# Patient Record
Sex: FEMALE | Race: WHITE | Employment: OTHER | URBAN - METROPOLITAN AREA
[De-identification: names, ages, dates, MRNs, and addresses within clinical notes are randomized per-mention and may not be internally consistent; named-entity substitution may affect disease eponyms.]

---

## 2017-01-09 PROBLEM — H61.23 IMPACTED CERUMEN, BILATERAL: Status: ACTIVE | Noted: 2017-01-09

## 2017-08-15 PROBLEM — J33.9 NASAL POLYPS: Status: ACTIVE | Noted: 2017-08-15

## 2017-08-15 PROBLEM — H90.3 SENSORINEURAL HEARING LOSS (SNHL) OF BOTH EARS: Status: ACTIVE | Noted: 2017-08-15

## 2018-07-24 PROCEDURE — 88305 TISSUE EXAM BY PATHOLOGIST: CPT | Performed by: OBSTETRICS & GYNECOLOGY

## 2019-04-25 PROBLEM — Z85.828 HISTORY OF BASAL CELL CANCER: Status: ACTIVE | Noted: 2019-04-25

## 2022-04-07 PROBLEM — M81.8 OTHER OSTEOPOROSIS WITHOUT CURRENT PATHOLOGICAL FRACTURE: Status: ACTIVE | Noted: 2022-04-07

## 2024-08-14 ENCOUNTER — TELEPHONE (OUTPATIENT)
Dept: UROLOGY | Facility: CLINIC | Age: 79
End: 2024-08-14

## 2024-08-14 NOTE — TELEPHONE ENCOUNTER
Outgoing call to patient to gather history prior to 8/19/24 10 am apt.   [facility] with call back number

## 2024-08-19 ENCOUNTER — OFFICE VISIT (OUTPATIENT)
Dept: UROLOGY | Facility: CLINIC | Age: 79
End: 2024-08-19
Attending: OBSTETRICS & GYNECOLOGY
Payer: MEDICARE

## 2024-08-19 VITALS
SYSTOLIC BLOOD PRESSURE: 164 MMHG | HEIGHT: 61.18 IN | DIASTOLIC BLOOD PRESSURE: 78 MMHG | TEMPERATURE: 98 F | BODY MASS INDEX: 22.84 KG/M2 | WEIGHT: 121 LBS

## 2024-08-19 DIAGNOSIS — N81.84 PELVIC MUSCLE WASTING: ICD-10-CM

## 2024-08-19 DIAGNOSIS — N81.11 CYSTOCELE, MIDLINE: ICD-10-CM

## 2024-08-19 DIAGNOSIS — N81.2 UTEROVAGINAL PROLAPSE, INCOMPLETE: ICD-10-CM

## 2024-08-19 DIAGNOSIS — N39.41 URGE INCONTINENCE: Primary | ICD-10-CM

## 2024-08-19 DIAGNOSIS — N81.6 RECTOCELE: ICD-10-CM

## 2024-08-19 PROCEDURE — 99212 OFFICE O/P EST SF 10 MIN: CPT

## 2024-08-19 RX ORDER — MAGNESIUM 200 MG
TABLET ORAL
COMMUNITY

## 2024-08-19 NOTE — PROGRESS NOTES
Called office of Dr. Roberto Bowie, PCP.  Left message with  requesting Dr. Bowie's recommendation for general surgeon for possible inguinal hernia evaluation.

## 2024-08-20 ENCOUNTER — ORDER TRANSCRIPTION (OUTPATIENT)
Dept: PHYSICAL THERAPY | Facility: HOSPITAL | Age: 79
End: 2024-08-20

## 2024-08-20 DIAGNOSIS — N39.41 URGE INCONTINENCE: Primary | ICD-10-CM

## 2024-08-20 DIAGNOSIS — N81.84 PELVIC MUSCLE WASTING: ICD-10-CM

## 2024-08-20 DIAGNOSIS — N81.2 UTEROVAGINAL PROLAPSE, INCOMPLETE: ICD-10-CM

## 2024-08-28 ENCOUNTER — TELEPHONE (OUTPATIENT)
Dept: UROLOGY | Facility: CLINIC | Age: 79
End: 2024-08-28

## 2024-08-28 NOTE — TELEPHONE ENCOUNTER
2nd call attempt to Dr. Bowie's office requesting general surgeon recommendation from Dr. Bowie, patient's PCP,  per Dr. Scott request on 8/19/24 to evaluate possible inguinal hernia.  Left message with front office, they report clinical will call back.

## 2024-10-07 ENCOUNTER — TELEPHONE (OUTPATIENT)
Dept: PHYSICAL THERAPY | Facility: HOSPITAL | Age: 79
End: 2024-10-07

## 2024-10-08 ENCOUNTER — APPOINTMENT (OUTPATIENT)
Dept: PHYSICAL THERAPY | Facility: HOSPITAL | Age: 79
End: 2024-10-08
Attending: OBSTETRICS & GYNECOLOGY
Payer: MEDICARE

## 2024-10-08 DIAGNOSIS — N81.84 PELVIC MUSCLE WASTING: ICD-10-CM

## 2024-10-08 DIAGNOSIS — N81.2 UTEROVAGINAL PROLAPSE, INCOMPLETE: ICD-10-CM

## 2024-10-08 DIAGNOSIS — N39.41 URGE INCONTINENCE: Primary | ICD-10-CM

## 2024-10-08 PROCEDURE — 97112 NEUROMUSCULAR REEDUCATION: CPT

## 2024-10-08 PROCEDURE — 97110 THERAPEUTIC EXERCISES: CPT

## 2024-10-08 PROCEDURE — 97162 PT EVAL MOD COMPLEX 30 MIN: CPT

## 2024-10-08 NOTE — PROGRESS NOTES
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 Has to push prolapse back up after standing for awhile. MD noted R inguinal hernia that pt hadn't noticed previously.  Current symptoms include: pressure and leakage    Pt describes pain level: none    Obstetrical/Gynecological history: : 6  Para: 5  Delivery method: vaginal  Occupation/Activities: yoga, exercise, bookclub, goes to lunch/dinner with friends.  PFDI-20: 94.80/300    Judy describes prior level of function no prolapse/pelvic pressure. Pt goals include decrease prolapse.  Past medical history was reviewed with Judy. Significant findings include  has a past medical history of Glaucoma, High blood pressure, HIGH CHOLESTEROL, History of breast cancer, History of skin cancer, MENOPAUSE, and OSTEOPENIA.     URINARY HABITS  Types of symptoms: urge incontinence and nocturia  Events associated with the onset of urinary complaints: n/a  Abdominal/Vaginal Pressure complaints: yes  Urinary Frequency: 5x/day, every 3 hours  Urine Stream: strong  Amount: normal  Leaking occurs: sometimes at night when doesn't make it to the bathroom  Episodes of Leakage: occasionally when has urgency at night and doesn't make it to the bathroom  Amount of leakage: min  Pad use: yes  Pad Change frequency: as needed  Nocturia: 2  Fluid Intake: water  Bladder irritants: 3  Urine Stop test: not sure  Post void dribble: no  Hovering: no  Empty bladder just in case: some times  Do you ever leak urine without knowing it? no    BOWEL HABITS  Types of symptoms: none   Frequency  of bowel movements: 2x/day  Do you strain with defecation: No   Laxative use: No    SEXUAL HEALTH STATUS  Sexual [place] Status: not active    ASSESSMENT  Judy presents to physical therapy evaluation with primary c/o pelvic organ prolapse. The results of the objective tests and measures show  decreased pelvic floor muscle strength and coordination, fair bladder habits, decreased core and hip strength, fair body mechanics with increased intra-abdominal pressure with transferring .  Functional deficits include but are not limited to embarrassment with leakage. 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 Pt and PT discussed evaluation findings, pathology, POC and HEP.  Pt voiced understanding and performs HEP correctly without reported pain. Skilled Pelvic Physical Therapy is medically necessary to address the above impairments and reach functional goals.    OBJECTIVE:   Posture: neutral  Pelvic Alignment: symmetrical   Gait: pt ambulates on level ground with normal mechanics.     External Palpation: no tenderness  Abdominal Wall Integrity: no restrictions   Diastasis Recti: none    Range Of Motion  Lumbar AROM screen: wfl  LE AROM screen: grossly WNL     Strength (MMT) 5/5 VENKAT LE except B hip abd and ext 4//5  Transverse Abdominis: 1/5    Flexibility Summary: WNL VENKAT LE except hamstrings -10 B 90/90    Informed consent for internal pelvic evaluation given: Yes    External Observation:   Voluntary contraction: present   Voluntary relaxation: present  Involuntary contraction: absent  Involuntary relaxation: present    Mons pubis: WNL  Labia majora: WNL  Labia minora: WNL  Urethral meatus: WNL  Introitus: WNL  Perineal body: WNL    Sensory/Reflex:  Vestibule: normal bilaterally  Anal [place]: Not Tested    Internal Examination     Pelvic Floor Muscle strength: (PERF= Power/Endurance/Reps/Fast) MMT: 1/1/3/unable  to do - pelvic floor muscles improved with internal pelvic floor muscles retraining  External Anal Sphincter: nt  Accessory Muscle Use: gluteals, adductors, abdominals    Tissue Laxity Test:  Anterior Wall: Mod  Posterior Wall: Min  Apical: Min    Eccentric lengthening contraction: wnl  Bearing down Valsalva maneuver (2-3x): ++ cystocele    Internal Palpation: WNL     Today's Treatment and Response:   Patient education was provided on objective findings of external and internal evaluation and expectations with treatment outcomes. Educated on pelvic anatomy and function with diagrams and pelvic model, bladder normatives, adequate hydration levels, proper toileting posture, stress/urge urinary incontinence strategies, coordination of diaphragmatic breathing and pelvic floor contraction , and knack/pelvic muscle brace, prolapse do's and dont's     Manual therapy/NM Re-ed- internal retraining pelvic floor muscles to learn to perform diaphragmatic breathing , abdominal bracing , kegel     Patient was instructed in and issued a HEP for: abdominal bracing x10, diaphragmatic breathing x10, Kegel 10 repetitions 3x/day, 10 sec on/10 sec off, 2 sec on 2-4 sec off     Charges: PT Eval Moderate Complexity, TE 15 NM 15      Total Timed Treatment: 30 min     Total Treatment Time: 50 min     Based on clinical rationale and outcome measures, this evaluation involved Moderate Complexity decision making due to 1-2 personal factors/comorbidities, 3 body structures involved/activity limitations, and evolving symptoms including urge urinary incontinence and pelvic organ prolapse  PLAN OF CARE:    Goals: (to be met in 10 visits)  1. Independent in HEP and progression with improved understanding of bladder/bowel health, bladder retraining and long-term management.    2. Patient will demonstrate improved bladder voiding habits to voiding every 2 hours with less urinary leakage.  3. Patient will demonstrate improve PFM isolation,  coordination and strength so she is able to reduce urinary urge and decrease leakage during ADL's.  4. Nocturnal voiding 1x/night for improved sleep.  5. PFM contraction before increase intra-abdominal pressure.  6. Pt will demonstrate correct pressure management strategies when transferring supine>sit      Frequency / Duration: Patient will be seen for 1 x/week or a total of 10 visits over a 90 day period.  Treatment will include: Manual Therapy, Neuromuscular Re-education, Self-Care Home Management, Therapeutic Activities, Therapeutic Exercise, Home Exercise Program instruction, and Modalities to include: Electrical stimulation (unattended)     Education or treatment limitation: None  Rehab Potential:good      Patient/Family/Caregiver was advised of these findings, precautions, and treatment options and has agreed to actively participate in planning and for this course of care.    Thank you for your referral. Please co-sign or sign and return this letter via fax as soon as possible to [phone]. If you have any questions, please contact me at Dept: [phone]    Sincerely,  Electronically signed by therapist: Annmarie Cho PT  Physician's certification required: Yes  I certify the need for these services furnished under this plan of treatment and while under my care.    X___________________________________________________ Date____________________    Certification From: 10/8/2024  To:1/6/2025

## 2024-10-16 ENCOUNTER — TELEPHONE (OUTPATIENT)
Dept: PHYSICAL THERAPY | Facility: HOSPITAL | Age: 79
End: 2024-10-16

## 2024-10-17 ENCOUNTER — APPOINTMENT (OUTPATIENT)
Dept: PHYSICAL THERAPY | Facility: HOSPITAL | Age: 79
End: 2024-10-17
Attending: OBSTETRICS & GYNECOLOGY
Payer: MEDICARE

## 2024-10-17 PROCEDURE — 97112 NEUROMUSCULAR REEDUCATION: CPT

## 2024-10-17 PROCEDURE — 97110 THERAPEUTIC EXERCISES: CPT

## 2024-10-17 NOTE — PROGRESS NOTES
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    Pain: 0/10      Objective: Tx flow sheet       URINARY HABITS  Types of symptoms: urge incontinence and nocturia  Events associated with the onset of urinary complaints: n/a  Abdominal/Vaginal Pressure complaints: yes  Urinary Frequency: 5x/day, every 3 hours  Urine Stream: strong  Amount: normal  Leaking occurs: sometimes at night when doesn't make it to the bathroom  Episodes of Leakage: occasionally when has urgency at night and doesn't make it to the bathroom  Amount of leakage: min  Pad use: yes  Pad Change frequency: as needed  Nocturia: 2  Fluid Intake: water  Bladder irritants: 3  Urine Stop test: not sure  Post void dribble: no  Hovering: no  Empty bladder just in case: some times  Do you ever leak urine without knowing it? no    BOWEL HABITS  Types of symptoms: none   Frequency of bowel movements: 2x/day  Do you strain with defecation: No   Laxative use: No    SEXUAL HEALTH STATUS  Sexual [place] Status: not active    ASSESSMENT  Judy presents to physical therapy evaluation with primary c/o pelvic organ prolapse. The results of the objective tests and measures show  decreased pelvic floor muscle strength and coordination, fair bladder habits, decreased core and hip strength, fair body mechanics with increased intra-abdominal pressure with transferring .  Functional deficits include but are not limited to embarrassment with leakage. 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 Pt and PT  discussed evaluation findings, pathology, POC and HEP.  Pt voiced understanding and performs HEP correctly without reported pain. Skilled Pelvic Physical Therapy is medically necessary to address the above impairments and reach functional goals.    OBJECTIVE:   Posture: neutral  Pelvic Alignment: symmetrical   Gait: pt ambulates on level ground with normal mechanics.     External Palpation: no tenderness  Abdominal Wall Integrity: no restrictions   Diastasis Recti: none    Range Of Motion  Lumbar AROM screen: wfl  LE AROM screen: grossly WNL     Strength (MMT) 5/5 VENKAT LE except B hip abd and ext 4//5  Transverse Abdominis: 1/5    Flexibility Summary: WNL VENKAT LE except hamstrings -10 B 90/90    Informed consent for internal pelvic evaluation given: Yes    External Observation:   Voluntary contraction: present   Voluntary relaxation: present  Involuntary contraction: absent  Involuntary relaxation: present    Mons pubis: WNL  Labia majora: WNL  Labia minora: WNL  Urethral meatus: WNL  Introitus: WNL  Perineal body: WNL    Sensory/Reflex:  Vestibule: normal bilaterally  Anal [place]: Not Tested    Internal Examination     Pelvic Floor Muscle strength: (PERF= Power/Endurance/Reps/Fast) MMT: 1/1/3/unable to do - pelvic floor muscles improved with internal pelvic floor muscles retraining  External Anal Sphincter: nt  Accessory Muscle Use: gluteals, adductors, abdominals    Tissue Laxity Test:  Anterior Wall: Mod  Posterior Wall: Min  Apical: Min    Eccentric lengthening contraction: wnl  Bearing down Valsalva maneuver (2-3x): ++ cystocele    Internal Palpation: WNL       Assessment: Extensive instructions with visuals explaining pelvic floor muscles and muscles with fascial connection to assist with supporting pelvic floor muscles which pt appeared to understand. Improved coordination of pelvic floor muscles via fascially connected mm. Upgraded HEP.       Goals:   Goals: (to be met in 10 visits)-progressing  1. Independent  in HEP and progression with improved understanding of bladder/bowel health, bladder retraining and long-term management.    2. Patient will demonstrate improved bladder voiding habits to voiding every 2 hours with less urinary leakage.  3. Patient will demonstrate improve PFM isolation, coordination and strength so she is able to reduce urinary urge and decrease leakage during ADL's.  4. Nocturnal voiding 1x/night for improved sleep.  5. PFM contraction before increase intra-abdominal pressure.  6. Pt will demonstrate correct pressure management strategies when transferring supine>sit    Plan: Continue plan of care as pt tolerates with focus on pelvic floor muscles. Next visit: NuStep 5min L5   .   Date: 10/17/2024  TX#: 2/10 Date:                 TX#: 3/ Date:                 TX#: 4/ Date:                 TX#: 5/ Date:   Tx#: 6/   NM  [facility]/phys pelvic floor muscles /diaphragm /Transverse abdominis /fascially connected mm education    Pressure management strategies -w visuals    Pt education:   Core muscles relationship to pelvic floor muscles     Retraining:  diaphragmatic breathing   abdominal bracing   kegel           Ther Ex:   Kegel +  iso hip add  10\" x10     Kegel + articulating bridge x10 w isometric adduction     Kegel +  Resisted   ER RTB  x10     Kegel + clamshell RTB x10 R/L      Kegel +  U/LE lift  x10     Kegel with plie/ER resisted RTB x10      Issued handout with HEP for above exs       HEP: abdominal bracing x10, diaphragmatic breathing x10, Kegel 10 repetitions 3x/day, 10 sec on/10 sec off, 2 sec on 2-4 sec off , + additions above     Charges: NM2 30 TE 15      Total Timed Treatment: 45 min  Total Treatment Time: 45 min

## 2024-10-23 ENCOUNTER — OFFICE VISIT (OUTPATIENT)
Dept: PHYSICAL THERAPY | Facility: HOSPITAL | Age: 79
End: 2024-10-23
Attending: OBSTETRICS & GYNECOLOGY
Payer: MEDICARE

## 2024-10-23 PROCEDURE — 97110 THERAPEUTIC EXERCISES: CPT

## 2024-10-23 PROCEDURE — 97112 NEUROMUSCULAR REEDUCATION: CPT

## 2024-10-23 NOTE — PROGRESS NOTES
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 This isn't typical.    Pain: 0/10      Objective: Tx flow sheet     Initial evaluation: + updates  URINARY HABITS  Types of symptoms: urge incontinence and nocturia  Abdominal/Vaginal Pressure complaints: yes  Urinary Frequency: 5x/day, every 3 hours  Leaking occurs: sometimes at night when doesn't make it to the bathroom  Episodes of Leakage: occasionally when has urgency at night and doesn't make it to the bathroom  Amount of leakage: min  Pad use: yes  Pad Change frequency: as needed  Nocturia: 2  Fluid Intake: water  Bladder irritants: 3  Urine Stop test: not sure  Hovering: no  Empty bladder just in case: some times      BOWEL HABITS  Types of symptoms: none   Frequency of bowel movements: 2x/day  SEXUAL HEALTH STATUS  Sexual [place] Status: not active    ASSESSMENT  Judy presents to physical therapy evaluation with primary c/o pelvic organ prolapse. The results of the objective tests and measures show  decreased pelvic floor muscle strength and coordination, fair bladder habits, decreased core and hip strength, fair body mechanics with increased intra-abdominal pressure with transferring .  Functional deficits include but are not limited to embarrassment with leakage. 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 Pt and PT discussed evaluation findings, pathology, POC and HEP.  Pt voiced  understanding and performs HEP correctly without reported pain. Skilled Pelvic Physical Therapy is medically necessary to address the above impairments and reach functional goals.    OBJECTIVE:   Posture: neutral  Pelvic Alignment: symmetrical   Gait: pt ambulates on level ground with normal mechanics.     External Palpation: no tenderness  Abdominal Wall Integrity: no restrictions   Diastasis Recti: none    Range Of Motion  Lumbar AROM screen: wfl  LE AROM screen: grossly WNL     Strength (MMT) 5/5 VENKAT LE except B hip abd and ext 4//5  Transverse Abdominis: 1/5    Flexibility Summary: WNL VENKAT LE except hamstrings -10 B 90/90    Informed consent for internal pelvic evaluation given: Yes    External Observation:   Voluntary contraction: present   Voluntary relaxation: present  Involuntary contraction: absent  Involuntary relaxation: present    Mons pubis: WNL  Labia majora: WNL  Labia minora: WNL  Urethral meatus: WNL  Introitus: WNL  Perineal body: WNL    Internal Examination     Pelvic Floor Muscle strength: (PERF= Power/Endurance/Reps/Fast) MMT: 1/1/3/unable to do - pelvic floor muscles improved with internal pelvic floor muscles retraining  Accessory Muscle Use: gluteals, adductors, abdominals    Tissue Laxity Test:  Anterior Wall: Mod  Posterior Wall: Min  Apical: Min    Eccentric lengthening contraction: wnl  Bearing down Valsalva maneuver (2-3x): ++ cystocele    Internal Palpation: WNL       Assessment: Pt appeared to understand how to coordinated pelvic floor muscle contraction with diaphragmatic breathing which was verbally and manually cued throughout the treatment. Use of pelvic model utilized to improved understanding. Progress pelvic brace in various planes of motion. Pt education: use of pillow under hips for gravity assistance when doing kegel's. Caution with L knee throughout session with modifications . Pt had no c/p L knee pain with exercises.    Goals:   Goals: (to be met in 10  visits)-progressing  1. Independent in HEP and progression with improved understanding of bladder/bowel health, bladder retraining and long-term management.    2. Patient will demonstrate improved bladder voiding habits to voiding every 2 hours with less urinary leakage.  3. Patient will demonstrate improve PFM isolation, coordination and strength so she is able to reduce urinary urge and decrease leakage during ADL's.  4. Nocturnal voiding 1x/night for improved sleep.  5. PFM contraction before increase intra-abdominal pressure.  6. Pt will demonstrate correct pressure management strategies when transferring supine>sit    Plan: Continue plan of care as pt tolerates with focus on pelvic floor muscles. Next visit: jesus with simulated activities of daily living    .   Date: 10/17/2024  TX#: 2/10 Date:       10/23/2024           TX#: 3/ Date:                 TX#: 4/ Date:                 TX#: 5/ Date:   Tx#: 6/   NM  [facility]/phys pelvic floor muscles /diaphragm /Transverse abdominis /fascially connected mm education    Pressure management strategies -w visuals    Pt education:   Core muscles relationship to pelvic floor muscles     Retraining:  diaphragmatic breathing   abdominal bracing   kegel     NM  [facility]/phys pelvic floor muscles /diaphragm /Transverse abdominis /fascially connected mm education    Pressure management strategies -w visuals    Pt education:   Core muscles relationship to pelvic floor muscles     Retraining:  diaphragmatic breathing   abdominal bracing   kegel      Ther Ex:   Kegel +  iso hip add  10\" x10     Kegel + articulating bridge x10 w isometric adduction     Kegel +  Resisted   ER RTB  x10     Kegel + clamshell RTB x10 R/L      Kegel +  U/LE lift  x10     Kegel with plie/ER resisted RTB x10      Issued handout with HEP for above exs Ther Ex:    NuStep 5min L1    Sit on ball-  pelvic brace x10    Supine-  pelvic brace x10  Instructed to use pillow under hips for home HEP    Precor-  SS w  abdominal bracing and diaphragmatic breathing 10# x15 ea     Deadlift 5#, 10# x10 ea w coordinated breathing     Shuttle- DLP  25# with pelvic brace + iso hip adduction x10 reps     Overhead lifting 2# w pelvic brace x10      HEP: abdominal bracing x10, diaphragmatic breathing x10, Kegel 10 repetitions 3x/day, 10 sec on/10 sec off, 2 sec on 2-4 sec off , + additions above     Charges: NM 15 TE2 30      Total Timed Treatment: 45 min  Total Treatment Time: 45 min

## 2024-10-31 ENCOUNTER — OFFICE VISIT (OUTPATIENT)
Dept: PHYSICAL THERAPY | Facility: HOSPITAL | Age: 79
End: 2024-10-31
Attending: OBSTETRICS & GYNECOLOGY
Payer: MEDICARE

## 2024-10-31 PROCEDURE — 97112 NEUROMUSCULAR REEDUCATION: CPT

## 2024-10-31 PROCEDURE — 97110 THERAPEUTIC EXERCISES: CPT

## 2024-10-31 NOTE — PROGRESS NOTES
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 Pelvic pressure some times.Some times has urgency when hits garage . Gets up 2x/night to void. No L knee pain after last visit.    Pain: 0/10      Objective: Tx flow sheet     Initial evaluation: + updates  URINARY HABITS  Types of symptoms: urge incontinence and nocturia  Abdominal/Vaginal Pressure complaints: yes  Urinary Frequency: 5x/day, every 3 hours  Leaking occurs: sometimes at night when doesn't make it to the bathroom  Episodes of Leakage: occasionally when has urgency at night and doesn't make it to the bathroom  Amount of leakage: min  Pad use: yes  Pad Change frequency: as needed  Nocturia: 2x  Fluid Intake: water  Bladder irritants: 3  Urine Stop test: not sure  Empty bladder just in case: some times      BOWEL HABITS  Types of symptoms: none   Frequency of bowel movements: 2x/day  SEXUAL HEALTH STATUS  Sexual [place] Status: not active    Strength (MMT) 5/5 VENKAT LE except B hip abd and ext 4//5  Transverse Abdominis: 1/5    Flexibility Summary: WNL VENKAT LE except hamstrings -10 B 90/90    Informed consent for internal pelvic evaluation given: Yes    External Observation:   Voluntary contraction: present   Voluntary relaxation: present  Involuntary contraction: absent  Involuntary relaxation: present    Mons pubis: WNL  Labia majora: WNL  Labia minora: WNL  Urethral meatus: WNL  Introitus: WNL  Perineal body: WNL    Internal Examination     Pelvic Floor Muscle strength: (PERF= Power/Endurance/Reps/Fast) MMT: 1/1/3/unable to do - pelvic floor muscles improved with internal pelvic  floor muscles retraining  Accessory Muscle Use: gluteals, adductors, abdominals    Tissue Laxity Test:  Anterior Wall: Mod  Posterior Wall: Min  Apical: Min    Eccentric lengthening contraction: wnl  Bearing down Valsalva maneuver (2-3x): ++ cystocele    Internal Palpation: WNL       Assessment: Progressed pt education with inclusion of night time tips with handout and reviewed UI strategies with handout. Caution with L knee throughout session with modifications . Pt had no c/p L knee pain with exercises.    Goals:   Goals: (to be met in 10 visits)-progressing  1. Independent in HEP and progression with improved understanding of bladder/bowel health, bladder retraining and long-term management.    2. Patient will demonstrate improved bladder voiding habits to voiding every 2 hours with less urinary leakage.  3. Patient will demonstrate improve PFM isolation, coordination and strength so she is able to reduce urinary urge and decrease leakage during ADL's.  4. Nocturnal voiding 1x/night for improved sleep.  5. PFM contraction before increase intra-abdominal pressure.  6. Pt will demonstrate correct pressure management strategies when transferring supine>sit    Plan: Continue plan of care as pt tolerates with focus on pelvic floor muscles. Next visit: jesus with simulated activities of daily living    .   Date: 10/17/2024  TX#: 2/10 Date:       10/23/2024           TX#: 3/ Date:      10/31/2024            TX#: 4/ Date:                 TX#: 5/ Date:   Tx#: 6/   NM  Miracle/phys pelvic floor muscles /diaphragm /Transverse abdominis /fascially connected mm education    Pressure management strategies -w visuals    Pt education:   Core muscles relationship to pelvic floor muscles     Retraining:  diaphragmatic breathing   abdominal bracing   jesus     NM  [facility]/phys pelvic floor muscles /diaphragm /Transverse abdominis /fascially connected mm education    Pressure management strategies -w visuals    Pt education:   Core  muscles relationship to pelvic floor muscles     Retraining:  diaphragmatic breathing   abdominal bracing   kegel NM  Miracle/phys pelvic floor muscles /diaphragm /Transverse abdominis /fascially connected mm education    Pressure management strategies -w visuals    Pt education:   Core muscles relationship to pelvic floor muscles     Retraining:  diaphragmatic breathing   abdominal bracing   Kegel    Night times strategies with handout    Urge strategies w handout     Ther Ex:   Kegel +  iso hip add  10\" x10     Kegel + articulating bridge x10 w isometric adduction     Kegel +  Resisted   ER RTB  x10     Kegel + clamshell RTB x10 R/L      Kegel +  U/LE lift  x10     Kegel with plie/ER resisted RTB x10      Issued handout with HEP for above exs Ther Ex:    NuStep 5min L1    Sit on ball-  pelvic brace x10    Supine-  pelvic brace x10  Instructed to use pillow under hips for home HEP    Precor-  SS w abdominal bracing and diaphragmatic breathing 10# x15 ea     Deadlift 5#, 10# x10 ea w coordinated breathing     Shuttle- DLP  25# with pelvic brace + iso hip adduction x10 reps     Overhead lifting 2# w pelvic brace x10 Ther Ex:   Kegel +  iso hip add  10\" x10     Kegel +  Resisted   ER RTB  x10        NuStep 5min L1    Sit on ball-  pelvic brace x10         HEP: abdominal bracing x10, diaphragmatic breathing x10, Kegel 10 repetitions 3x/day, 10 sec on/10 sec off, 2 sec on 2-4 sec off , + additions above     Charges: NM2 30 TE 15     Total Timed Treatment: 45 min  Total Treatment Time: 45 min

## 2024-11-05 ENCOUNTER — OFFICE VISIT (OUTPATIENT)
Dept: PHYSICAL THERAPY | Facility: HOSPITAL | Age: 79
End: 2024-11-05
Attending: OBSTETRICS & GYNECOLOGY
Payer: MEDICARE

## 2024-11-05 PROCEDURE — 97110 THERAPEUTIC EXERCISES: CPT

## 2024-11-05 PROCEDURE — 97112 NEUROMUSCULAR REEDUCATION: CPT

## 2024-11-05 NOTE — PROGRESS NOTES
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 Urge deferment with success. Pelvic pressure some times.Gets up 2x/night to void. No L knee pain after last visit.    Pain: 0/10-L knee more stiff since it's raining      Objective: Tx flow sheet     Initial evaluation: + updates  URINARY HABITS  Types of symptoms: urge incontinence and nocturia  Abdominal/Vaginal Pressure complaints: yes  Urinary Frequency: 5x/day, every 3 hours  Leaking occurs: sometimes at night when doesn't make it to the bathroom  Episodes of Leakage: occasionally when has urgency at night and doesn't make it to the bathroom  Amount of leakage: min  Pad use: yes  Pad Change frequency: as needed  Nocturia: 2x  Fluid Intake: water  Bladder irritants: 3  Urine Stop test: not sure  Empty bladder just in case: some times      BOWEL HABITS  Types of symptoms: none   Frequency of bowel movements: 2x/day  SEXUAL HEALTH STATUS  Sexual [place] Status: not active    Strength (MMT) 5/5 VENKAT LE except B hip abd and ext 4//5  Transverse Abdominis: 1/5    Flexibility Summary: WNL VENKAT LE except hamstrings -10 B 90/90    Informed consent for internal pelvic evaluation given: Yes    External Observation:   Voluntary contraction: present   Voluntary relaxation: present  Involuntary contraction: absent  Involuntary relaxation: present    Mons pubis: WNL  Labia majora: WNL  Labia minora: WNL  Urethral meatus: WNL  Introitus: WNL  Perineal body: WNL    Internal Examination     Pelvic Floor Muscle strength: (PERF= Power/Endurance/Reps/Fast) MMT: 1/1/3/unable to do - pelvic floor muscles improved with  internal pelvic floor muscles retraining  Accessory Muscle Use: gluteals, adductors, abdominals    Tissue Laxity Test:  Anterior Wall: Mod  Posterior Wall: Min  Apical: Min    Eccentric lengthening contraction: wnl  Bearing down Valsalva maneuver (2-3x): ++ cystocele    Internal Palpation: WNL       Assessment: Progressed pt education with inclusion of bladder fitness and bladder irritants as possible contributors to pt's symptoms. Extensive retraining to decrease nocturia and leakage. Caution with L knee throughout session with modifications . Pt had no c/p L knee pain with exercises.    Goals:   Goals: (to be met in 10 visits)-progressing  1. Independent in HEP and progression with improved understanding of bladder/bowel health, bladder retraining and long-term management.    2. Patient will demonstrate improved bladder voiding habits to voiding every 2 hours with less urinary leakage.  3. Patient will demonstrate improve PFM isolation, coordination and strength so she is able to reduce urinary urge and decrease leakage during ADL's.  4. Nocturnal voiding 1x/night for improved sleep.  5. PFM contraction before increase intra-abdominal pressure.  6. Pt will demonstrate correct pressure management strategies when transferring supine>sit    Plan: Continue plan of care as pt tolerates with focus on pelvic floor muscles. Next visit: jesus with simulated activities of daily living    .   Date: 10/17/2024  TX#: 2/10 Date:       10/23/2024           TX#: 3/ Date:      10/31/2024            TX#: 4/ Date:        11/5/2024          TX#: 5/ Date:   Tx#: 6/ ate:   Tx#: ate:   Tx#: ate:   Tx#:   NM  [facility]/phys pelvic floor muscles /diaphragm /Transverse abdominis /fascially connected mm education    Pressure management strategies -w visuals    Pt education:   Core muscles relationship to pelvic floor muscles     Retraining:  diaphragmatic breathing   abdominal bracing   kegel     NM  [facility]/phys pelvic floor muscles /diaphragm  /Transverse abdominis /fascially connected mm education    Pressure management strategies -w visuals    Pt education:   Core muscles relationship to pelvic floor muscles     Retraining:  diaphragmatic breathing   abdominal bracing   kegel NM  [facility]/phys pelvic floor muscles /diaphragm /Transverse abdominis /fascially connected mm education    Pressure management strategies -w visuals    Pt education:   Core muscles relationship to pelvic floor muscles     Retraining:  diaphragmatic breathing   abdominal bracing   Kegel    Night times strategies with handout    Urge strategies w handout NM  [facility]/phys pelvic floor muscles /diaphragm /Transverse abdominis /fascially connected mm education    Pressure management strategies -w visuals    Pt education:   Core muscles relationship to pelvic floor muscles     Retraining:  diaphragmatic breathing   abdominal bracing   Kegel    Night times strategies with handout    Urge strategies     Bladder fitness w handout    Urge deferment    Bladder irritants + handout       Ther Ex:   Kegel +  iso hip add  10\" x10     Kegel + articulating bridge x10 w isometric adduction     Kegel +  Resisted   ER RTB  x10     Kegel + clamshell RTB x10 R/L      Kegel +  U/LE lift  x10     Kegel with plie/ER resisted RTB x10      Issued handout with HEP for above exs Ther Ex:    NuStep 5min L1    Sit on ball-  pelvic brace x10    Supine-  pelvic brace x10  Instructed to use pillow under hips for home HEP    Precor-  SS w abdominal bracing and diaphragmatic breathing 10# x15 ea     Deadlift 5#, 10# x10 ea w coordinated breathing     Shuttle- DLP  25# with pelvic brace + iso hip adduction x10 reps     Overhead lifting 2# w pelvic brace x10 Ther Ex:   Kegel +  iso hip add  10\" x10     Kegel +  Resisted   ER RTB  x10        NuStep 5min L1    Sit on ball-  pelvic brace x10     Ther Ex:   Pelvic ring-  Kegel + isometric adduction 10 sec x10  Kegel + isometric hip ER 10 sec x10  Functional squat w pelvic brace  x10 + io hip add  Overhead lifting 3# w pelvic brace x10       HEP: abdominal bracing x10, diaphragmatic breathing x10, Kegel 10 repetitions 3x/day, 10 sec on/10 sec off, 2 sec on 2-4 sec off , + additions above     Charges: NM2 30 TE 15     Total Timed Treatment: 45 min  Total Treatment Time: 45 min

## 2024-11-07 ENCOUNTER — APPOINTMENT (OUTPATIENT)
Dept: PHYSICAL THERAPY | Facility: HOSPITAL | Age: 79
End: 2024-11-07
Attending: OBSTETRICS & GYNECOLOGY
Payer: MEDICARE

## 2024-11-14 ENCOUNTER — OFFICE VISIT (OUTPATIENT)
Dept: PHYSICAL THERAPY | Facility: HOSPITAL | Age: 79
End: 2024-11-14
Attending: OBSTETRICS & GYNECOLOGY
Payer: MEDICARE

## 2024-11-14 PROCEDURE — 97110 THERAPEUTIC EXERCISES: CPT

## 2024-11-14 PROCEDURE — 97112 NEUROMUSCULAR REEDUCATION: CPT

## 2024-11-14 NOTE — PROGRESS NOTES
Diagnosis:   Urge incontinence (N39.41)  Uterovaginal prolapse, incomplete (N81.2)  Pelvic muscle wasting (N81.84)      Referring Provider: Jovi Scott  Date of Evaluation:    10/8/2024    Precautions:  L knee issues Next MD visit:   none scheduled  Date of Surgery: n/a   Insurance Primary/Secondary: MEDICARE / [employer] [place] INDEMNITY     # Auth Visits: medicare            Subjective: Had an incident of leakage after drinking [employer] and when ran after 2 year old grandchild. Nocturia typically 2x but had 1x only 1x.no pelvic pressure. Has some difficulty knowing if she is doing kegel's correctly.Urge deferment with success. No L knee pain after last visit.    Pain: 0/10-L knee more stiff since it's raining      Objective: Tx flow sheet     Initial evaluation: + updates  URINARY HABITS  Types of symptoms: urge incontinence and nocturia  Abdominal/Vaginal Pressure complaints: yes  Urinary Frequency: 5x/day, every 3 hours  Leaking occurs: sometimes at night when doesn't make it to the bathroom  Episodes of Leakage: occasionally when has urgency at night and doesn't make it to the bathroom  Amount of leakage: min  Pad use: yes  Pad Change frequency: as needed  Nocturia: 2x  Fluid Intake: water  Bladder irritants: 3  Urine Stop test: not sure  Empty bladder just in case: some times      BOWEL HABITS  Types of symptoms: none   Frequency of bowel movements: 2x/day  SEXUAL HEALTH STATUS  Sexual [place] Status: not active    Strength (MMT) 5/5 VENKAT LE except B hip abd and ext 4//5  Transverse Abdominis: 1/5    Flexibility Summary: WNL VENKAT LE except hamstrings -10 B 90/90    Informed consent for internal pelvic evaluation given: Yes    External Observation:   Voluntary contraction: present   Voluntary relaxation: present  Involuntary contraction: absent  Involuntary relaxation: present    Mons pubis: WNL  Labia majora: WNL  Labia minora: WNL  Urethral meatus: WNL  Introitus: WNL  Perineal body: WNL    Internal  Examination     Pelvic Floor Muscle strength: (PERF= Power/Endurance/Reps/Fast) MMT: 1/1/3/unable to do - pelvic floor muscles improved with internal pelvic floor muscles retraining  Accessory Muscle Use: gluteals, adductors, abdominals    Tissue Laxity Test:  Anterior Wall: Mod  Posterior Wall: Min  Apical: Min    Eccentric lengthening contraction: wnl  Bearing down Valsalva maneuver (2-3x): ++ cystocele    Internal Palpation: WNL       Assessment: Progressed pelvic floor muscle strengthening with addition of elevator ex to improve understanding . Reviewed strategies to decrease nocturia and leakage. Caution with L knee throughout session with modifications . Pt had no c/p L knee pain with exercises.    Goals:   Goals: (to be met in 10 visits)-progressing  1. Independent in HEP and progression with improved understanding of bladder/bowel health, bladder retraining and long-term management.    2. Patient will demonstrate improved bladder voiding habits to voiding every 2 hours with less urinary leakage.  3. Patient will demonstrate improve PFM isolation, coordination and strength so she is able to reduce urinary urge and decrease leakage during ADL's.  4. Nocturnal voiding 1x/night for improved sleep.  5. PFM contraction before increase intra-abdominal pressure.  6. Pt will demonstrate correct pressure management strategies when transferring supine>sit    Plan: Continue plan of care as pt tolerates with focus on pelvic floor muscles. Next visit: jesus with simulated activities of daily living    .   Date: 10/17/2024  TX#: 2/10 Date:       10/23/2024           TX#: 3/ Date:      10/31/2024            TX#: 4/ Date:        11/5/2024          TX#: 5/ Date: 11/14/2024   Tx#: 6/ ate:   Tx#: ate:   Tx#: ate:   Tx#:   NM  [facility]/phys pelvic floor muscles /diaphragm /Transverse abdominis /fascially connected mm education    Pressure management strategies -w visuals    Pt education:   Core muscles relationship to pelvic floor  muscles     Retraining:  diaphragmatic breathing   abdominal bracing   kegel     NM  [facility]/phys pelvic floor muscles /diaphragm /Transverse abdominis /fascially connected mm education    Pressure management strategies -w visuals    Pt education:   Core muscles relationship to pelvic floor muscles     Retraining:  diaphragmatic breathing   abdominal bracing   kegel NM  [facility]/phys pelvic floor muscles /diaphragm /Transverse abdominis /fascially connected mm education    Pressure management strategies -w visuals    Pt education:   Core muscles relationship to pelvic floor muscles     Retraining:  diaphragmatic breathing   abdominal bracing   Kegel    Night times strategies with handout    Urge strategies w handout NM  [facility]/phys pelvic floor muscles /diaphragm /Transverse abdominis /fascially connected mm education    Pressure management strategies -w visuals    Pt education:   Core muscles relationship to pelvic floor muscles     Retraining:  diaphragmatic breathing   abdominal bracing       Night times strategies with handout    Urge strategies     Bladder fitness w handout    Urge deferment    Bladder irritants + handout NM  [facility]/phys pelvic floor muscles /diaphragm /Transverse abdominis /fascially connected mm education    Pressure management strategies -w visuals    Pt education:   Core muscles relationship to pelvic floor muscles     Retraining:  diaphragmatic breathing   abdominal bracing   Kegel-various visualizations  Kegel-elevator-HEP      Ther Ex:   Kegel +  iso hip add  10\" x10     Kegel + articulating bridge x10 w isometric adduction     Kegel +  Resisted   ER RTB  x10     Kegel + clamshell RTB x10 R/L      Kegel +  U/LE lift  x10     Kegel with plie/ER resisted RTB x10      Issued handout with HEP for above exs Ther Ex:    NuStep 5min L1    Sit on ball-  pelvic brace x10    Supine-  pelvic brace x10  Instructed to use pillow under hips for home HEP    Precor-  SS w abdominal bracing and diaphragmatic  breathing 10# x15 ea     Deadlift 5#, 10# x10 ea w coordinated breathing     Shuttle- DLP  25# with pelvic brace + iso hip adduction x10 reps     Overhead lifting 2# w pelvic brace x10 Ther Ex:   Kegel +  iso hip add  10\" x10     Kegel +  Resisted   ER RTB  x10        NuStep 5min L1    Sit on ball-  pelvic brace x10     Ther Ex:   NuStep 5min L1  Pelvic ring-  Kegel + isometric adduction 10 sec x10  Functional squat w pelvic brace x10 + io hip add  Overhead lifting 3# w pelvic brace x10    diaphragmatic breathing +  Kegel   + Pelvic tilt  + bridge  +resisted ER RTB      Ther Ex:   Kegel +  iso hip add  10\" x10     diaphragmatic breathing +  Kegel   + Pelvic tilt  + bridge  +resisted ER RTB     Sit on ball-  Kegel's  Tonic  Phasic    Kegel+  SS, walking FW  W diaphragmatic breathing       HEP: abdominal bracing x10, diaphragmatic breathing x10, Kegel 10 repetitions 3x/day, 10 sec on/10 sec off, 2 sec on 2-4 sec off , + additions above     Access Code: [email or web address]  URL: [email or web address]  Date: 11/14/2024  Prepared by: Annmarie Cho    Exercises  - Seated Pelvic Floor Elevators  - 1 x daily - 7 x weekly - 1 sets - 10 reps  - Pelvic Floor Muscle Contraction and Pelvic Tilt to Bridge With Hips Elevated (for Pelvic Organ Prolapse)  - 1 x daily - 7 x weekly - 1 sets - 10 reps  - Diaphragmatic Breathing to Reduce Intra-abdominal Pressure: Lifting a Basket  - 1 x daily - 7 x weekly - 1 sets - 1 reps  - Pelvic Floor Muscle Contraction and Adductor Squeeze With Hips Elevated (for Pelvic Organ Prolapse)  - 1 x daily - 7 x weekly - 1 sets - 10 reps  - Pelvic Floor Muscle Contraction With Resisted Abduction With Hips Elevated (for Pelvic Organ Prolapse)  - 1 x daily - 7 x weekly - 3 sets - 10 reps  - Diaphragmatic Breathing to Reduce Intra-abdominal Pressure: Sit to Stand  - 1 x daily - 7 x weekly - 1 sets - 1 reps    Charges: NM 15 TE2 25     Total Timed Treatment: 40 min  Total Treatment Time: 40  min

## 2024-11-21 ENCOUNTER — OFFICE VISIT (OUTPATIENT)
Dept: PHYSICAL THERAPY | Facility: HOSPITAL | Age: 79
End: 2024-11-21
Attending: OBSTETRICS & GYNECOLOGY
Payer: MEDICARE

## 2024-11-21 PROCEDURE — 97110 THERAPEUTIC EXERCISES: CPT

## 2024-11-21 PROCEDURE — 97112 NEUROMUSCULAR REEDUCATION: CPT

## 2024-11-21 NOTE — PROGRESS NOTES
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    Pain: 0/10      Objective: Tx flow sheet     Initial evaluation: + updates  URINARY HABITS  Types of symptoms: urge incontinence and nocturia  Abdominal/Vaginal Pressure complaints: yes  Urinary Frequency: 5x/day, every 3 hours  Leaking occurs: sometimes at night when doesn't make it to the bathroom  Episodes of Leakage: occasionally when has urgency at night and doesn't make it to the bathroom  Amount of leakage: min  Pad use: yes  Pad Change frequency: as needed  Nocturia: 2x  Fluid Intake: water  Bladder irritants: 3  Urine Stop test: not sure  Empty bladder just in case: some times      BOWEL HABITS  Types of symptoms: none   Frequency of bowel movements: 2x/day  SEXUAL HEALTH STATUS  Sexual [place] Status: not active    Strength (MMT) 5/5 VENKAT LE except B hip abd and ext 4//5  Transverse Abdominis: 1/5    Flexibility Summary: WNL VENKAT LE except hamstrings -10 B 90/90    Informed consent for internal pelvic evaluation given: Yes    External Observation:   Voluntary contraction: present   Voluntary relaxation: present  Involuntary contraction: absent  Involuntary relaxation: present    Mons pubis: WNL  Labia majora: WNL  Labia minora: WNL  Urethral meatus: WNL  Introitus: WNL  Perineal body: WNL    Internal Examination     Pelvic Floor Muscle strength: (PERF= Power/Endurance/Reps/Fast) MMT: 1/1/3/unable to do - pelvic floor muscles improved with internal pelvic floor muscles retraining  Accessory Muscle Use: gluteals, adductors, abdominals    Tissue Laxity Test:  Anterior  Wall: Mod  Posterior Wall: Min  Apical: Min    Eccentric lengthening contraction: wnl  Bearing down Valsalva maneuver (2-3x): ++ cystocele    Internal Palpation: WNL       Assessment: Progressed pelvic floor muscle strengthening with visualization techniques to improve recruitment. Reviewed strategies to decrease nocturia and leakage. Caution with L knee throughout session with modifications . Pt had no c/p L knee pain with exercises.    Goals:   Goals: (to be met in 10 visits)-progressing  1. Independent in HEP and progression with improved understanding of bladder/bowel health, bladder retraining and long-term management.    2. Patient will demonstrate improved bladder voiding habits to voiding every 2 hours with less urinary leakage.  3. Patient will demonstrate improve PFM isolation, coordination and strength so she is able to reduce urinary urge and decrease leakage during ADL's.  4. Nocturnal voiding 1x/night for improved sleep.  5. PFM contraction before increase intra-abdominal pressure.  6. Pt will demonstrate correct pressure management strategies when transferring supine>sit    Plan: Continue plan of care as pt tolerates with focus on pelvic floor muscles. Next visit: jesus with simulated activities of daily living    .   Date: 10/17/2024  TX#: 2/10 Date:       10/23/2024           TX#: 3/ Date:      10/31/2024            TX#: 4/ Date:        11/5/2024          TX#: 5/ Date: 11/14/2024   Tx#: 6/ Date: 11/21/2024   Tx#: 7/ Date:   Tx#: Date:   Tx#:   NM  Miracle/phys pelvic floor muscles /diaphragm /Transverse abdominis /fascially connected mm education    Pressure management strategies -w visuals    Pt education:   Core muscles relationship to pelvic floor muscles     Retraining:  diaphragmatic breathing   abdominal bracing   jesus     NM  Miracle/phys pelvic floor muscles /diaphragm /Transverse abdominis /fascially connected mm education    Pressure management strategies -w visuals    Pt education:   Core  muscles relationship to pelvic floor muscles     Retraining:  diaphragmatic breathing   abdominal bracing   kegel NM  Miracle/phys pelvic floor muscles /diaphragm /Transverse abdominis /fascially connected mm education    Pressure management strategies -w visuals    Pt education:   Core muscles relationship to pelvic floor muscles     Retraining:  diaphragmatic breathing   abdominal bracing   Kegel    Night times strategies with handout    Urge strategies w handout NM  Miracle/phys pelvic floor muscles /diaphragm /Transverse abdominis /fascially connected mm education    Pressure management strategies -w visuals    Pt education:   Core muscles relationship to pelvic floor muscles     Retraining:  diaphragmatic breathing   abdominal bracing       Night times strategies with handout    Urge strategies     Bladder fitness w handout    Urge deferment    Bladder irritants + handout NM  Miracle/phys pelvic floor muscles /diaphragm /Transverse abdominis /fascially connected mm education    Pressure management strategies -w visuals    Pt education:   Core muscles relationship to pelvic floor muscles     Retraining:  diaphragmatic breathing   abdominal bracing   Kegel-various visualizations  Kegel-elevator-HEP NM  Miracle/phys pelvic floor muscles /diaphragm /Transverse abdominis /fascially connected mm education    Pressure management strategies -w visuals    Pt education:   Core muscles relationship to pelvic floor muscles     Retraining:  diaphragmatic breathing   abdominal bracing   Kegel-various visualizations             Ther Ex:   Kegel +  iso hip add  10\" x10     Kegel + articulating bridge x10 w isometric adduction     Kegel +  Resisted   ER RTB  x10     Kegel + clamshell RTB x10 R/L      Kegel +  U/LE lift  x10     Kegel with plie/ER resisted RTB x10      Issued handout with HEP for above exs Ther Ex:    NuStep 5min L1    Sit on ball-  pelvic brace x10    Supine-  pelvic brace x10  Instructed to use pillow under hips for home  HEP    Precor-  SS w abdominal bracing and diaphragmatic breathing 10# x15 ea     Deadlift 5#, 10# x10 ea w coordinated breathing     Shuttle- DLP  25# with pelvic brace + iso hip adduction x10 reps     Overhead lifting 2# w pelvic brace x10 Ther Ex:   Kegel +  iso hip add  10\" x10     Kegel +  Resisted   ER RTB  x10        NuStep 5min L1    Sit on ball-  pelvic brace x10     Ther Ex:   NuStep 5min L1  Pelvic ring-  Kegel + isometric adduction 10 sec x10  Functional squat w pelvic brace x10 + io hip add  Overhead lifting 3# w pelvic brace x10    diaphragmatic breathing +  Kegel   + Pelvic tilt  + bridge  +resisted ER RTB      Ther Ex:   Kegel +  iso hip add  10\" x10     diaphragmatic breathing +  Kegel   + Pelvic tilt  + bridge  +resisted ER RTB     Sit on ball-  Kegel's  Tonic  Phasic    Kegel+  SS, walking FW  W diaphragmatic breathing  Ther Ex:     NuStep 5min L5     Shuttle- DLP  12# with pelvic brace + iso hip adduction x20 reps    Kegel + abdominal bracing +  Iso hip add  1. Supine-w various visualizations for PFM contraction  2. Pelvic Tilt  3. Reaching overhead  4. Bridge  5. Standing  6. Fxnl squat      Sit>stand x3         HEP: abdominal bracing x10, diaphragmatic breathing x10, Kegel 10 repetitions 3x/day, 10 sec on/10 sec off, 2 sec on 2-4 sec off , + additions above     Access Code: [email or web address]  URL: [email or web address]  Date: 11/14/2024  Prepared by: Annmarie Cho    Exercises  - Seated Pelvic Floor Elevators  - 1 x daily - 7 x weekly - 1 sets - 10 reps  - Pelvic Floor Muscle Contraction and Pelvic Tilt to Bridge With Hips Elevated (for Pelvic Organ Prolapse)  - 1 x daily - 7 x weekly - 1 sets - 10 reps  - Diaphragmatic Breathing to Reduce Intra-abdominal Pressure: Lifting a Basket  - 1 x daily - 7 x weekly - 1 sets - 1 reps  - Pelvic Floor Muscle Contraction and Adductor Squeeze With Hips Elevated (for Pelvic Organ Prolapse)  - 1 x daily - 7 x weekly - 1 sets - 10 reps  - Pelvic  Floor Muscle Contraction With Resisted Abduction With Hips Elevated (for Pelvic Organ Prolapse)  - 1 x daily - 7 x weekly - 3 sets - 10 reps  - Diaphragmatic Breathing to Reduce Intra-abdominal Pressure: Sit to Stand  - 1 x daily - 7 x weekly - 1 sets - 1 reps    Access Code: [email or web address]  URL: [email or web address]  Date: 11/21/2024  Prepared by: Annmarie Cho    Exercises  - Supine Pelvic Floor Contraction  - 1 x daily - 7 x weekly - 3 sets - 10 reps  - Supine Posterior Pelvic Tilt with Pelvic Floor Contraction  - 1 x daily - 7 x weekly - 3 sets - 10 reps  - Supine Bridge with Pelvic Floor Contraction  - 1 x daily - 7 x weekly - 1 sets - 10 reps  - Seated Pelvic Floor Contraction with Isometric Hip Adduction  - 1 x daily - 7 x weekly - 1 sets - 10 reps  - Standing Pelvic Floor Contraction  - 1 x daily - 7 x weekly - 1 sets - 10 reps  - Standing Pelvic Tilts with Arm Lift At Wall With Pelvic Floor Contraction  - 1 x daily - 7 x weekly - 1 sets - 10 reps  - Sit to Stand with Pelvic Floor Contraction  - 1 x daily - 7 x weekly - 1 sets - 10 reps    Charges: NM 15 TE2 30     Total Timed Treatment: 45 min  Total Treatment Time: 45 min

## 2024-11-26 ENCOUNTER — OFFICE VISIT (OUTPATIENT)
Dept: PHYSICAL THERAPY | Facility: HOSPITAL | Age: 79
End: 2024-11-26
Attending: OBSTETRICS & GYNECOLOGY
Payer: MEDICARE

## 2024-11-26 PROCEDURE — 97110 THERAPEUTIC EXERCISES: CPT

## 2024-11-26 PROCEDURE — 97112 NEUROMUSCULAR REEDUCATION: CPT

## 2024-11-26 NOTE — PROGRESS NOTES
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 Pt reports that she was sitting on a low couch as well. Doesn't feel prolapse most of the time except when she is standing for long periods of time.     Pain: 0/10      Objective: Tx flow sheet     Initial evaluation: + updates  URINARY HABITS  Types of symptoms: urge incontinence and nocturia  Abdominal/Vaginal Pressure complaints: yes  Urinary Frequency: 5x/day, every 3 hours  Leaking occurs: sometimes at night when doesn't make it to the bathroom  Episodes of Leakage: occasionally when has urgency at night and doesn't make it to the bathroom  Amount of leakage: min  Pad use: yes  Pad Change frequency: as needed  Nocturia: 2x  Fluid Intake: water  Bladder irritants: 3  Urine Stop test: not sure  Empty bladder just in case: some times      BOWEL HABITS  Types of symptoms: none   Frequency of bowel movements: 2x/day  SEXUAL HEALTH STATUS  Sexual [place] Status: not active    Strength (MMT) 5/5 VENKAT LE except B hip abd and ext 4//5  Transverse Abdominis: 1/5    Flexibility Summary: WNL VENKAT LE except hamstrings -10 B 90/90    Informed consent for internal pelvic evaluation given: Yes    External Observation:   Voluntary contraction: present   Voluntary relaxation: present  Involuntary contraction: absent  Involuntary relaxation: present    Mons pubis: WNL  Labia majora: WNL  Labia minora: WNL  Urethral meatus: WNL  Introitus: WNL  Perineal body: WNL    Internal Examination     Pelvic  Floor Muscle strength: (PERF= Power/Endurance/Reps/Fast) MMT: 1/1/3/unable to do - pelvic floor muscles improved with internal pelvic floor muscles retraining  Accessory Muscle Use: gluteals, adductors, abdominals    Tissue Laxity Test:  Anterior Wall: Mod  Posterior Wall: Min  Apical: Min    Eccentric lengthening contraction: wnl  Bearing down Valsalva maneuver (2-3x): ++ cystocele    Internal Palpation: WNL       Assessment: Progressed pelvic floor muscle strengthening with lumbopelvic strengthening. Reviewed strategies to decrease nocturia and leakage. Caution with L knee throughout session with modifications . Pt had no c/p L knee pain with exercises. Goals progressing as expected.     Goals:   Goals: (to be met in 10 visits)-progressing  1. Independent in HEP and progression with improved understanding of bladder/bowel health, bladder retraining and long-term management.    2. Patient will demonstrate improved bladder voiding habits to voiding every 2 hours with less urinary leakage.  3. Patient will demonstrate improve PFM isolation, coordination and strength so she is able to reduce urinary urge and decrease leakage during ADL's.  4. Nocturnal voiding 1x/night for improved sleep.  5. PFM contraction before increase intra-abdominal pressure.  6. Pt will demonstrate correct pressure management strategies when transferring supine>sit    Plan: Continue plan of care as pt tolerates with focus on pelvic floor muscles. Next visit: jesus with simulated activities of daily living  -cont  .   Date:       10/23/2024           TX#: 3/ Date:      10/31/2024            TX#: 4/ Date:        11/5/2024          TX#: 5/ Date: 11/14/2024   Tx#: 6/ Date: 11/21/2024   Tx#: 7/ Date: 11/26/2024   Tx#: 8/ Date:   Tx#:9 Date:   Tx#:10   NM  Miracle/phys pelvic floor muscles /diaphragm /Transverse abdominis /fascially connected mm education    Pressure management strategies -w visuals    Pt education:   Core muscles relationship to  pelvic floor muscles     Retraining:  diaphragmatic breathing   abdominal bracing   kegel NM  Miracle/phys pelvic floor muscles /diaphragm /Transverse abdominis /fascially connected mm education    Pressure management strategies -w visuals    Pt education:   Core muscles relationship to pelvic floor muscles     Retraining:  diaphragmatic breathing   abdominal bracing   Kegel    Night times strategies with handout    Urge strategies w handout NM  Miracle/phys pelvic floor muscles /diaphragm /Transverse abdominis /fascially connected mm education    Pressure management strategies -w visuals    Pt education:   Core muscles relationship to pelvic floor muscles     Retraining:  diaphragmatic breathing   abdominal bracing       Night times strategies with handout    Urge strategies     Bladder fitness w handout    Urge deferment    Bladder irritants + handout NM  Miracle/phys pelvic floor muscles /diaphragm /Transverse abdominis /fascially connected mm education    Pressure management strategies -w visuals    Pt education:   Core muscles relationship to pelvic floor muscles     Retraining:  diaphragmatic breathing   abdominal bracing   Kegel-various visualizations  Kegel-elevator-HEP NM  Miracle/phys pelvic floor muscles /diaphragm /Transverse abdominis /fascially connected mm education    Pressure management strategies -w visuals    Pt education:   Core muscles relationship to pelvic floor muscles     Retraining:  diaphragmatic breathing   abdominal bracing   Kegel-various visualizations         NM  Miracle/phys pelvic floor muscles /diaphragm /Transverse abdominis /fascially connected mm education    Pressure management strategies -w visuals    Bladder voiding strategies, transfer training    Pt education:   Core muscles relationship to pelvic floor muscles              Ther Ex:    NuStep 5min L1    Sit on ball-  pelvic brace x10    Supine-  pelvic brace x10  Instructed to use pillow under hips for home HEP    Precor-  SS w abdominal  bracing and diaphragmatic breathing 10# x15 ea     Deadlift 5#, 10# x10 ea w coordinated breathing     Shuttle- DLP  25# with pelvic brace + iso hip adduction x10 reps     Overhead lifting 2# w pelvic brace x10 Ther Ex:   Kegel +  iso hip add  10\" x10     Kegel +  Resisted   ER RTB  x10        NuStep 5min L1    Sit on ball-  pelvic brace x10     Ther Ex:   NuStep 5min L1  Pelvic ring-  Kegel + isometric adduction 10 sec x10  Functional squat w pelvic brace x10 + io hip add  Overhead lifting 3# w pelvic brace x10    diaphragmatic breathing +  Kegel   + Pelvic tilt  + bridge  +resisted ER RTB      Ther Ex:   Kegel +  iso hip add  10\" x10     diaphragmatic breathing +  Kegel   + Pelvic tilt  + bridge  +resisted ER RTB     Sit on ball-  Kegel's  Tonic  Phasic    Kegel+  SS, walking FW  W diaphragmatic breathing  Ther Ex:     NuStep 5min L1     Shuttle- DLP  12# with pelvic brace + iso hip adduction x20 reps    Kegel + abdominal bracing +  Iso hip add  1. Supine-w various visualizations for PFM contraction  2. Pelvic Tilt  3. Reaching overhead  4. Bridge  5. Standing  6. Fxnl squat      Sit>stand x3     Ther Ex:     NuStep 5min L1    Kegel +  iso hip add+ pelvic tilt  10\" x10     Kegel + articulating bridge x10 w isometric adduction     Kegel +  Resisted   ER RTB  x10     Shuttle- DLP  12# with pelvic brace + iso hip adduction x20 reps    SS RTB x1'  Hip abd RTB x10 ea  Hip ext RTB x10 ea    Precor-  SS w abdominal bracing and diaphragmatic breathing 10# x10 ea      HEP: abdominal bracing x10, diaphragmatic breathing x10, Kegel 10 repetitions 3x/day, 10 sec on/10 sec off, 2 sec on 2-4 sec off , + additions above     Access Code: [email or web address]  URL: [email or web address]  Date: 11/14/2024  Prepared by: Annmarie Cho    Exercises  - Seated Pelvic Floor Elevators  - 1 x daily - 7 x weekly - 1 sets - 10 reps  - Pelvic Floor Muscle Contraction and Pelvic Tilt to Bridge With Hips Elevated (for Pelvic Organ  Prolapse)  - 1 x daily - 7 x weekly - 1 sets - 10 reps  - Diaphragmatic Breathing to Reduce Intra-abdominal Pressure: Lifting a Basket  - 1 x daily - 7 x weekly - 1 sets - 1 reps  - Pelvic Floor Muscle Contraction and Adductor Squeeze With Hips Elevated (for Pelvic Organ Prolapse)  - 1 x daily - 7 x weekly - 1 sets - 10 reps  - Pelvic Floor Muscle Contraction With Resisted Abduction With Hips Elevated (for Pelvic Organ Prolapse)  - 1 x daily - 7 x weekly - 3 sets - 10 reps  - Diaphragmatic Breathing to Reduce Intra-abdominal Pressure: Sit to Stand  - 1 x daily - 7 x weekly - 1 sets - 1 reps    Access Code: [email or web address]  URL: [email or web address]  Date: 11/21/2024  Prepared by: Annmarie hCo    Exercises  - Supine Pelvic Floor Contraction  - 1 x daily - 7 x weekly - 3 sets - 10 reps  - Supine Posterior Pelvic Tilt with Pelvic Floor Contraction  - 1 x daily - 7 x weekly - 3 sets - 10 reps  - Supine Bridge with Pelvic Floor Contraction  - 1 x daily - 7 x weekly - 1 sets - 10 reps  - Seated Pelvic Floor Contraction with Isometric Hip Adduction  - 1 x daily - 7 x weekly - 1 sets - 10 reps  - Standing Pelvic Floor Contraction  - 1 x daily - 7 x weekly - 1 sets - 10 reps  - Standing Pelvic Tilts with Arm Lift At Wall With Pelvic Floor Contraction  - 1 x daily - 7 x weekly - 1 sets - 10 reps  - Sit to Stand with Pelvic Floor Contraction  - 1 x daily - 7 x weekly - 1 sets - 10 reps    Charges: NM 15 TE2 30     Total Timed Treatment: 45 min  Total Treatment Time: 45 min

## 2024-12-05 ENCOUNTER — OFFICE VISIT (OUTPATIENT)
Dept: PHYSICAL THERAPY | Facility: HOSPITAL | Age: 79
End: 2024-12-05
Attending: OBSTETRICS & GYNECOLOGY
Payer: MEDICARE

## 2024-12-05 PROCEDURE — 97112 NEUROMUSCULAR REEDUCATION: CPT

## 2024-12-05 PROCEDURE — 97110 THERAPEUTIC EXERCISES: CPT

## 2024-12-05 NOTE — PROGRESS NOTES
Diagnosis:   Urge incontinence (N39.41)  Uterovaginal prolapse, incomplete (N81.2)  Pelvic muscle wasting (N81.84)      Referring Provider: Jovi Scott  Date of Evaluation:    10/8/2024    Precautions:  L knee issues Next MD visit:   none scheduled  Date of Surgery: n/a   Insurance Primary/Secondary: MEDICARE / [employer] [place] INDEMNITY     # Auth Visits: medicare            Subjective: No leakage. No nocturia last night after taking aspirin and small glass of milk. Doesn't feel prolapse most of the time except when she is standing for long periods of time.     Pain: 0/10      Objective: Tx flow sheet     Initial evaluation: + updates  URINARY HABITS  Types of symptoms: urge incontinence and nocturia  Abdominal/Vaginal Pressure complaints: yes  Urinary Frequency: 5x/day, every 3 hours  Leaking occurs: sometimes at night when doesn't make it to the bathroom  Episodes of Leakage: occasionally when has urgency at night and doesn't make it to the bathroom  Amount of leakage: min  Pad use: yes  Pad Change frequency: as needed  Nocturia: 2x  Fluid Intake: water  Bladder irritants: 3  Urine Stop test: not sure  Empty bladder just in case: some times      BOWEL HABITS  Types of symptoms: none   Frequency of bowel movements: 2x/day  SEXUAL HEALTH STATUS  Sexual [place] Status: not active    Strength (MMT) 5/5 VENKAT LE except B hip abd and ext 4//5  Transverse Abdominis: 1/5    Flexibility Summary: WNL VENKAT LE except hamstrings -10 B 90/90    Informed consent for internal pelvic evaluation given: Yes    External Observation:   Voluntary contraction: present   Voluntary relaxation: present  Involuntary contraction: absent  Involuntary relaxation: present    Mons pubis: WNL  Labia majora: WNL  Labia minora: WNL  Urethral meatus: WNL  Introitus: WNL  Perineal body: WNL    Internal Examination     Pelvic Floor Muscle strength: (PERF= Power/Endurance/Reps/Fast) MMT: 1/1/3/unable to do - pelvic floor muscles improved with internal  pelvic floor muscles retraining  Accessory Muscle Use: gluteals, adductors, abdominals    Tissue Laxity Test:  Anterior Wall: Mod  Posterior Wall: Min  Apical: Min    Eccentric lengthening contraction: wnl  Bearing down Valsalva maneuver (2-3x): ++ cystocele    Internal Palpation: WNL       Assessment: Progressed pelvic floor muscle strengthening with lumbopelvic strengthening. Reviewed strategies to decrease nocturia and leakage. Caution with L knee throughout session with modifications . Pt had no c/p L knee pain with exercises. Pt education: treatment for prolapse discussed and instructed pt to perform these strategies into her ADL's. Goals progressing as expected.     Goals:   Goals: (to be met in 10 visits)-progressing  1. Independent in HEP and progression with improved understanding of bladder/bowel health, bladder retraining and long-term management.    2. Patient will demonstrate improved bladder voiding habits to voiding every 2 hours with less urinary leakage.  3. Patient will demonstrate improve PFM isolation, coordination and strength so she is able to reduce urinary urge and decrease leakage during ADL's.  4. Nocturnal voiding 1x/night for improved sleep.  5. PFM contraction before increase intra-abdominal pressure.  6. Pt will demonstrate correct pressure management strategies when transferring supine>sit    Plan: Continue plan of care as pt tolerates with focus on pelvic floor muscles. Next visit: jesus with simulated activities of daily living  -poss discharge  .   Date:       10/23/2024           TX#: 3/ Date:      10/31/2024            TX#: 4/ Date:        11/5/2024          TX#: 5/ Date: 11/14/2024   Tx#: 6/ Date: 11/21/2024   Tx#: 7/ Date: 11/26/2024   Tx#: 8/ Date: 12/5/2024   Tx#:9 Date:   Tx#:10   NM  Miracle/phys pelvic floor muscles /diaphragm /Transverse abdominis /fascially connected mm education    Pressure management strategies -w visuals    Pt education:   Core muscles relationship to  pelvic floor muscles     Retraining:  diaphragmatic breathing   abdominal bracing   kegel NM  Miracle/phys pelvic floor muscles /diaphragm /Transverse abdominis /fascially connected mm education    Pressure management strategies -w visuals    Pt education:   Core muscles relationship to pelvic floor muscles     Retraining:  diaphragmatic breathing   abdominal bracing   Kegel    Night times strategies with handout    Urge strategies w handout NM  Miracle/phys pelvic floor muscles /diaphragm /Transverse abdominis /fascially connected mm education    Pressure management strategies -w visuals    Pt education:   Core muscles relationship to pelvic floor muscles     Retraining:  diaphragmatic breathing   abdominal bracing       Night times strategies with handout    Urge strategies     Bladder fitness w handout    Urge deferment    Bladder irritants + handout NM  Miracle/phys pelvic floor muscles /diaphragm /Transverse abdominis /fascially connected mm education    Pressure management strategies -w visuals    Pt education:   Core muscles relationship to pelvic floor muscles     Retraining:  diaphragmatic breathing   abdominal bracing   Kegel-various visualizations  Kegel-elevator-HEP NM  Miracle/phys pelvic floor muscles /diaphragm /Transverse abdominis /fascially connected mm education    Pressure management strategies -w visuals    Pt education:   Core muscles relationship to pelvic floor muscles     Retraining:  diaphragmatic breathing   abdominal bracing   Kegel-various visualizations         NM  Miracle/phys pelvic floor muscles /diaphragm /Transverse abdominis /fascially connected mm education    Pressure management strategies -w visuals    Bladder voiding strategies, transfer training    Pt education:   Core muscles relationship to pelvic floor muscles          NM  Miracle/phys pelvic floor muscles /diaphragm /Transverse abdominis /fascially connected mm education    Pressure management strategies -w visuals    Bladder voiding  strategies, transfer training    Pt education:   Core muscles relationship to pelvic floor muscles     prolapse do's and dont's with strategies during activities of daily living  NM  PFDI scoring and interpretion with patient strategies to reduce impairments.    Ther Ex:    NuStep 5min L1    Sit on ball-  pelvic brace x10    Supine-  pelvic brace x10  Instructed to use pillow under hips for home HEP    Precor-  SS w abdominal bracing and diaphragmatic breathing 10# x15 ea     Deadlift 5#, 10# x10 ea w coordinated breathing     Shuttle- DLP  25# with pelvic brace + iso hip adduction x10 reps     Overhead lifting 2# w pelvic brace x10 Ther Ex:   Kegel +  iso hip add  10\" x10     Kegel +  Resisted   ER RTB  x10        NuStep 5min L1    Sit on ball-  pelvic brace x10     Ther Ex:   NuStep 5min L1  Pelvic ring-  Kegel + isometric adduction 10 sec x10  Functional squat w pelvic brace x10 + io hip add  Overhead lifting 3# w pelvic brace x10    diaphragmatic breathing +  Kegel   + Pelvic tilt  + bridge  +resisted ER RTB      Ther Ex:   Kegel +  iso hip add  10\" x10     diaphragmatic breathing +  Kegel   + Pelvic tilt  + bridge  +resisted ER RTB     Sit on ball-  Kegel's  Tonic  Phasic    Kegel+  SS, walking FW  W diaphragmatic breathing  Ther Ex:     NuStep 5min L1     Shuttle- DLP  12# with pelvic brace + iso hip adduction x20 reps    Kegel + abdominal bracing +  Iso hip add  1. Supine-w various visualizations for PFM contraction  2. Pelvic Tilt  3. Reaching overhead  4. Bridge  5. Standing  6. Fxnl squat      Sit>stand x3     Ther Ex:     NuStep 5min L1    Kegel +  iso hip add+ pelvic tilt  10\" x10     Kegel + articulating bridge x10 w isometric adduction     Kegel +  Resisted   ER RTB  x10     Shuttle- DLP  12# with pelvic brace + iso hip adduction x20 reps    SS RTB x1'  Hip abd RTB x10 ea  Hip ext RTB x10 ea    Precor-  SS w abdominal bracing and diaphragmatic breathing 10# x10 ea  Ther Ex:     NuStep 5min L1    Kegel  +  iso hip add+ pelvic tilt  10\" x10     Kegel + articulating bridge x10 w isometric adduction     Kegel +  Resisted   ER RTB  x10     Shuttle- DLP  12# with pelvic brace + iso hip adduction x30 reps    SS RTB x1'  Hip abd RTB x10 ea  Hip ext RTB x10 ea    Precor-  SS w abdominal bracing and diaphragmatic breathing 10# x10 ea     Sit on ball-  Kegel  Phasic  tonic    Standing-  Kegel  tonic Ther Ex:   Progress note    HEP: abdominal bracing x10, diaphragmatic breathing x10, Kegel 10 repetitions 3x/day, 10 sec on/10 sec off, 2 sec on 2-4 sec off , + additions above     Access Code: [email or web address]  URL: [email or web address]  Date: 11/14/2024  Prepared by: Annmarie Cho    Exercises  - Seated Pelvic Floor Elevators  - 1 x daily - 7 x weekly - 1 sets - 10 reps  - Pelvic Floor Muscle Contraction and Pelvic Tilt to Bridge With Hips Elevated (for Pelvic Organ Prolapse)  - 1 x daily - 7 x weekly - 1 sets - 10 reps  - Diaphragmatic Breathing to Reduce Intra-abdominal Pressure: Lifting a Basket  - 1 x daily - 7 x weekly - 1 sets - 1 reps  - Pelvic Floor Muscle Contraction and Adductor Squeeze With Hips Elevated (for Pelvic Organ Prolapse)  - 1 x daily - 7 x weekly - 1 sets - 10 reps  - Pelvic Floor Muscle Contraction With Resisted Abduction With Hips Elevated (for Pelvic Organ Prolapse)  - 1 x daily - 7 x weekly - 3 sets - 10 reps  - Diaphragmatic Breathing to Reduce Intra-abdominal Pressure: Sit to Stand  - 1 x daily - 7 x weekly - 1 sets - 1 reps    Access Code: [email or web address]  URL: [email or web address]  Date: 11/21/2024  Prepared by: Annmarie Cho    Exercises  - Supine Pelvic Floor Contraction  - 1 x daily - 7 x weekly - 3 sets - 10 reps  - Supine Posterior Pelvic Tilt with Pelvic Floor Contraction  - 1 x daily - 7 x weekly - 3 sets - 10 reps  - Supine Bridge with Pelvic Floor Contraction  - 1 x daily - 7 x weekly - 1 sets - 10 reps  - Seated Pelvic Floor Contraction with Isometric Hip  Adduction  - 1 x daily - 7 x weekly - 1 sets - 10 reps  - Standing Pelvic Floor Contraction  - 1 x daily - 7 x weekly - 1 sets - 10 reps  - Standing Pelvic Tilts with Arm Lift At Wall With Pelvic Floor Contraction  - 1 x daily - 7 x weekly - 1 sets - 10 reps  - Sit to Stand with Pelvic Floor Contraction  - 1 x daily - 7 x weekly - 1 sets - 10 reps    Charges: NM 15 TE2 30     Total Timed Treatment: 45 min  Total Treatment Time: 45 min

## 2024-12-12 ENCOUNTER — OFFICE VISIT (OUTPATIENT)
Dept: PHYSICAL THERAPY | Facility: HOSPITAL | Age: 79
End: 2024-12-12
Attending: OBSTETRICS & GYNECOLOGY
Payer: MEDICARE

## 2024-12-12 PROCEDURE — 97112 NEUROMUSCULAR REEDUCATION: CPT

## 2024-12-12 PROCEDURE — 97110 THERAPEUTIC EXERCISES: CPT

## 2024-12-12 NOTE — PROGRESS NOTES
Diagnosis:   Urge incontinence (N39.41)  Uterovaginal prolapse, incomplete (N81.2)  Pelvic muscle wasting (N81.84)      Referring Provider: Jovi Scott  Date of Evaluation:    10/8/2024    Precautions:  L knee issues Next MD visit:   none scheduled  Date of Surgery: n/a   Insurance Primary/Secondary: MEDICARE / [employer] [place] INDEMNITY     # Auth Visits: medicare            DISCHARGE SUMMARY  PT SEEN 10X'S IN PHYSICAL THERAPY    Subjective: Pelvic floor muscles feel stronger. Still feels prolapse but feels confident with strategies to reduce increased pressure on pelvic floor muscles with activities of daily living. No leakage. Nocturia typically 1x/night. Doesn't feel prolapse most of the time except when she is standing for long periods of time. Pt reports feeling confident to continue HEP independently.    PFDI-20: 25.00/300 (WAS: 94.80/300)    Pain: 0/10      Objective: Tx flow sheet       CAPITALIZATION = PROGRESS NOTE UPDATES      URINARY HABITS   Types of symptoms:   Urge incontinence-RESOLVED  Abdominal/Vaginal Pressure complaints: LESSENED (WAS:yes)  Urinary Frequency: 5x/day, every 3 hours  Leaking occurs: sometimes at night when doesn't make it to the bathroom  Episodes of Leakage: occasionally when has urgency at night and doesn't make it to the bathroom  Amount of leakage: RESOLVED (WAS:min)  Pad use: yes-STILL WEARING PANTYLINER  Pad Change frequency: as needed  Nocturia: 1-2X/NIGHT (WAS:2x)  Fluid Intake: water  Bladder irritants: 3  Urine Stop test: not sure  Empty bladder just in case: some times-TRYING NOT TO      BOWEL HABITS  Types of symptoms: none   Frequency of bowel movements: 2x/day    SEXUAL HEALTH STATUS  Sexual [place] Status: not active    Strength (MMT) 5/5 VENKAT LE except B hip abd and ext 5/5 (WAS:4//5)  Transverse Abdominis: 2/5 (WAS:1/5)    Flexibility Summary: WNL VENKAT LE except hamstrings -10 B 90/90    Informed consent for internal pelvic evaluation given: Yes    External  Observation:   Voluntary contraction: present   Voluntary relaxation: present  Involuntary contraction: absent  Involuntary relaxation: present    Mons pubis: WNL  Labia majora: WNL  Labia minora: WNL  Urethral meatus: WNL  Introitus: WNL  Perineal body: WNL    Internal Examination     Pelvic Floor Muscle strength: (PERF= Power/Endurance/Reps/Fast) MMT: 1/1/5/3 - pelvic floor muscles improved with internal pelvic floor muscles retraining-PT PREFERRED NO INTERNAL RE-ASSESSMENT OF PFM  Accessory Muscle Use: gluteals, adductors, abdominals    Tissue Laxity Test:  Anterior Wall: Mod  Posterior Wall: Min  Apical: Min    Eccentric lengthening contraction: wnl  Bearing down Valsalva maneuver (2-3x): ++ cystocele    Internal Palpation: WNL       Assessment: Pt has made significant improvement in physical therapy with improved bladder/bowel habits strength, and function with resolution of leakage.  All goals met.  Pt motivated to continue HEP.      PFDI 68.8PT IMPROVEMENT    Goals:   Goals: (to be met in 10 visits)-  1. Independent in HEP and progression with improved understanding of bladder/bowel health, bladder retraining and long-term management. -MET  2. Patient will demonstrate improved bladder voiding habits to voiding every 2 hours with less urinary leakage.-MET  3. Patient will demonstrate improve PFM isolation, coordination and strength so she is able to reduce urinary urge and decrease leakage during ADL's.-MET  4. Nocturnal voiding 1x/night for improved sleep.-MET  5. PFM contraction before increase intra-abdominal pressure.-MET  6. Pt will demonstrate correct pressure management strategies when transferring supine>sit-MET    Plan: Pt considered discharged from physical therapy.  Pt instructed to call clinic if he/she has any further questions and to continue home exercise program.   .   Date:       10/23/2024           TX#: 3/ Date:      10/31/2024            TX#: 4/ Date:        11/5/2024          TX#: 5/ Date:  11/14/2024   Tx#: 6/ Date: 11/21/2024   Tx#: 7/ Date: 11/26/2024   Tx#: 8/ Date: 12/5/2024   Tx#:9 Date: 12/12/2024   Tx#:10   NM  Miracle/phys pelvic floor muscles /diaphragm /Transverse abdominis /fascially connected mm education    Pressure management strategies -w visuals    Pt education:   Core muscles relationship to pelvic floor muscles     Retraining:  diaphragmatic breathing   abdominal bracing   kegel NM  Miracle/phys pelvic floor muscles /diaphragm /Transverse abdominis /fascially connected mm education    Pressure management strategies -w visuals    Pt education:   Core muscles relationship to pelvic floor muscles     Retraining:  diaphragmatic breathing   abdominal bracing   Kegel    Night times strategies with handout    Urge strategies w handout NM  Miracle/phys pelvic floor muscles /diaphragm /Transverse abdominis /fascially connected mm education    Pressure management strategies -w visuals    Pt education:   Core muscles relationship to pelvic floor muscles     Retraining:  diaphragmatic breathing   abdominal bracing       Night times strategies with handout    Urge strategies     Bladder fitness w handout    Urge deferment    Bladder irritants + handout NM  Miracle/phys pelvic floor muscles /diaphragm /Transverse abdominis /fascially connected mm education    Pressure management strategies -w visuals    Pt education:   Core muscles relationship to pelvic floor muscles     Retraining:  diaphragmatic breathing   abdominal bracing   Kegel-various visualizations  Kegel-elevator-HEP NM  Miracle/phys pelvic floor muscles /diaphragm /Transverse abdominis /fascially connected mm education    Pressure management strategies -w visuals    Pt education:   Core muscles relationship to pelvic floor muscles     Retraining:  diaphragmatic breathing   abdominal bracing   Kegel-various visualizations         NM  Miracle/phys pelvic floor muscles /diaphragm /Transverse abdominis /fascially connected mm education    Pressure  management strategies -w visuals    Bladder voiding strategies, transfer training    Pt education:   Core muscles relationship to pelvic floor muscles          NM  Miracle/phys pelvic floor muscles /diaphragm /Transverse abdominis /fascially connected mm education    Pressure management strategies -w visuals    Bladder voiding strategies, transfer training    Pt education:   Core muscles relationship to pelvic floor muscles     prolapse do's and dont's with strategies during activities of daily living  NM  PFDI scoring and interpretion with patient strategies to reduce impairments.    Miracle/phys pelvic floor muscles /diaphragm /Transverse abdominis /fascially connected mm education    Pressure management strategies    Bladder voiding strategies, transfer training    Pt education:   Core muscles relationship to pelvic floor muscles     prolapse do's and dont's with strategies during activities of daily living     Ther Ex:    NuStep 5min L1    Sit on ball-  pelvic brace x10    Supine-  pelvic brace x10  Instructed to use pillow under hips for home HEP    Precor-  SS w abdominal bracing and diaphragmatic breathing 10# x15 ea     Deadlift 5#, 10# x10 ea w coordinated breathing     Shuttle- DLP  25# with pelvic brace + iso hip adduction x10 reps     Overhead lifting 2# w pelvic brace x10 Ther Ex:   Kegel +  iso hip add  10\" x10     Kegel +  Resisted   ER RTB  x10        NuStep 5min L1    Sit on ball-  pelvic brace x10     Ther Ex:   NuStep 5min L1  Pelvic ring-  Kegel + isometric adduction 10 sec x10  Functional squat w pelvic brace x10 + io hip add  Overhead lifting 3# w pelvic brace x10    diaphragmatic breathing +  Kegel   + Pelvic tilt  + bridge  +resisted ER RTB      Ther Ex:   Kegel +  iso hip add  10\" x10     diaphragmatic breathing +  Kegel   + Pelvic tilt  + bridge  +resisted ER RTB     Sit on ball-  Kegel's  Tonic  Phasic    Kegel+  SS, walking FW  W diaphragmatic breathing  Ther Ex:     NuStep 5min L1     Shuttle-  DLP  12# with pelvic brace + iso hip adduction x20 reps    Kegel + abdominal bracing +  Iso hip add  1. Supine-w various visualizations for PFM contraction  2. Pelvic Tilt  3. Reaching overhead  4. Bridge  5. Standing  6. Fxnl squat      Sit>stand x3     Ther Ex:     NuStep 5min L1    Kegel +  iso hip add+ pelvic tilt  10\" x10     Kegel + articulating bridge x10 w isometric adduction     Kegel +  Resisted   ER RTB  x10     Shuttle- DLP  12# with pelvic brace + iso hip adduction x20 reps    SS RTB x1'  Hip abd RTB x10 ea  Hip ext RTB x10 ea    Precor-  SS w abdominal bracing and diaphragmatic breathing 10# x10 ea  Ther Ex:     NuStep 5min L1    Kegel +  iso hip add+ pelvic tilt  10\" x10     Kegel + articulating bridge x10 w isometric adduction     Kegel +  Resisted   ER RTB  x10     Shuttle- DLP  12# with pelvic brace + iso hip adduction x30 reps    SS RTB x1'  Hip abd RTB x10 ea  Hip ext RTB x10 ea    Precor-  SS w abdominal bracing and diaphragmatic breathing 10# x10 ea     Sit on ball-  Kegel  Phasic  tonic    Standing-  Kegel  tonic Ther Ex:     Progress note    NuStep 5min L1     Kegel +  iso hip add  10\" x10     Kegel +  Resisted   ER RTB  x10     Core strengthening-  sahrmann    Kegel + articulating bridge x10 w isometric adduction     Reviewed HEP    Standing-  Kegel  tonic             HEP: abdominal bracing x10, diaphragmatic breathing x10, Kegel 10 repetitions 3x/day, 10 sec on/10 sec off, 2 sec on 2-4 sec off , + additions above     Access Code: [email or web address]  URL: [email or web address]  Date: 11/14/2024  Prepared by: Annmarie Cho    Exercises  - Seated Pelvic Floor Elevators  - 1 x daily - 7 x weekly - 1 sets - 10 reps  - Pelvic Floor Muscle Contraction and Pelvic Tilt to Bridge With Hips Elevated (for Pelvic Organ Prolapse)  - 1 x daily - 7 x weekly - 1 sets - 10 reps  - Diaphragmatic Breathing to Reduce Intra-abdominal Pressure: Lifting a Basket  - 1 x daily - 7 x weekly - 1 sets - 1  reps  - Pelvic Floor Muscle Contraction and Adductor Squeeze With Hips Elevated (for Pelvic Organ Prolapse)  - 1 x daily - 7 x weekly - 1 sets - 10 reps  - Pelvic Floor Muscle Contraction With Resisted Abduction With Hips Elevated (for Pelvic Organ Prolapse)  - 1 x daily - 7 x weekly - 3 sets - 10 reps  - Diaphragmatic Breathing to Reduce Intra-abdominal Pressure: Sit to Stand  - 1 x daily - 7 x weekly - 1 sets - 1 reps    Access Code: [email or web address]  URL: [email or web address]  Date: 11/21/2024  Prepared by: Annmarie Cho    Exercises  - Supine Pelvic Floor Contraction  - 1 x daily - 7 x weekly - 3 sets - 10 reps  - Supine Posterior Pelvic Tilt with Pelvic Floor Contraction  - 1 x daily - 7 x weekly - 3 sets - 10 reps  - Supine Bridge with Pelvic Floor Contraction  - 1 x daily - 7 x weekly - 1 sets - 10 reps  - Seated Pelvic Floor Contraction with Isometric Hip Adduction  - 1 x daily - 7 x weekly - 1 sets - 10 reps  - Standing Pelvic Floor Contraction  - 1 x daily - 7 x weekly - 1 sets - 10 reps  - Standing Pelvic Tilts with Arm Lift At Wall With Pelvic Floor Contraction  - 1 x daily - 7 x weekly - 1 sets - 10 reps  - Sit to Stand with Pelvic Floor Contraction  - 1 x daily - 7 x weekly - 1 sets - 10 reps    Charges: NM 15 TE2 30  Total Timed Treatment: 45 min  Total Treatment Time: 45 min

## 2024-12-20 ENCOUNTER — OFFICE VISIT (OUTPATIENT)
Dept: UROLOGY | Facility: CLINIC | Age: 79
End: 2024-12-20
Attending: OBSTETRICS & GYNECOLOGY
Payer: MEDICARE

## 2024-12-20 VITALS
BODY MASS INDEX: 23 KG/M2 | TEMPERATURE: 98 F | DIASTOLIC BLOOD PRESSURE: 70 MMHG | SYSTOLIC BLOOD PRESSURE: 124 MMHG | HEIGHT: 61 IN

## 2024-12-20 DIAGNOSIS — N81.6 RECTOCELE: ICD-10-CM

## 2024-12-20 DIAGNOSIS — N81.2 UTEROVAGINAL PROLAPSE, INCOMPLETE: Primary | ICD-10-CM

## 2024-12-20 DIAGNOSIS — N81.11 CYSTOCELE, MIDLINE: ICD-10-CM

## 2024-12-20 PROCEDURE — 99212 OFFICE O/P EST SF 10 MIN: CPT

## 2024-12-20 RX ORDER — FLUOROURACIL 50 MG/G
CREAM TOPICAL
COMMUNITY
Start: 2024-11-21

## 2024-12-20 RX ORDER — TAMOXIFEN CITRATE 20 MG/1
20 TABLET ORAL DAILY
COMMUNITY

## 2024-12-20 RX ORDER — FLUTICASONE PROPIONATE 50 MCG
2 SPRAY, SUSPENSION (ML) NASAL DAILY
COMMUNITY
Start: 2024-07-18

## 2024-12-20 RX ORDER — ALENDRONATE SODIUM 70 MG/1
70 TABLET ORAL WEEKLY
COMMUNITY
Start: 2024-03-06

## 2025-01-16 ENCOUNTER — TELEPHONE (OUTPATIENT)
Dept: ORTHOPEDICS CLINIC | Facility: CLINIC | Age: 80
End: 2025-01-16

## 2025-01-16 DIAGNOSIS — M25.561 PAIN IN BOTH KNEES, UNSPECIFIED CHRONICITY: Primary | ICD-10-CM

## 2025-01-16 DIAGNOSIS — M25.562 PAIN IN BOTH KNEES, UNSPECIFIED CHRONICITY: Primary | ICD-10-CM

## 2025-01-16 NOTE — TELEPHONE ENCOUNTER
Patient is scheduled for VENKAT knee pain. No xrays in Epic. Please advise if imaging is needed.  Future Appointments   Date Time Provider Department Center   1/30/2025  9:20 AM Geeta Holcomb PA-C [facility] None   3/5/2025  3:20 PM Gaston Shahid MD [facility]

## 2025-01-17 NOTE — TELEPHONE ENCOUNTER
1st attempt to reach the patient. No answer at this time.  Message left on identifying voicemail per release.     Future Appointments   Date Time Provider Department Center   1/30/2025  9:20 AM Geeta Holcomb PA-C [facility] None   3/5/2025  2:30 PM [facility] XR [facility]1 [facility] [place]   3/5/2025  3:00 PM [facility] XR RM1 [facility] [place]   3/5/2025  3:20 PM Gaston Shahid MD [facility]

## 2025-01-30 ENCOUNTER — OFFICE VISIT (OUTPATIENT)
Dept: UROLOGY | Facility: CLINIC | Age: 80
End: 2025-01-30
Attending: OBSTETRICS & GYNECOLOGY
Payer: MEDICARE

## 2025-01-30 VITALS
DIASTOLIC BLOOD PRESSURE: 64 MMHG | BODY MASS INDEX: 23 KG/M2 | SYSTOLIC BLOOD PRESSURE: 118 MMHG | HEIGHT: 61 IN | TEMPERATURE: 98 F

## 2025-01-30 DIAGNOSIS — N95.2 POSTMENOPAUSAL ATROPHIC VAGINITIS: ICD-10-CM

## 2025-01-30 DIAGNOSIS — N81.84 PELVIC MUSCLE WASTING: ICD-10-CM

## 2025-01-30 DIAGNOSIS — N81.2 UTEROVAGINAL PROLAPSE, INCOMPLETE: Primary | ICD-10-CM

## 2025-01-30 PROCEDURE — 57160 INSERT PESSARY/OTHER DEVICE: CPT

## 2025-01-30 NOTE — PROGRESS NOTES
Pt here for pessary trial due to bulge   Wants to exhaust conservative options  Denies UI complaints  Denies WYATT complaints  No UTI's  Bowels regular     Pt is not using vaginal cream - takes tamoxifen, has one year remaining      She is not currently interested in surgical management of her pelvic organ prolapse.  Unsure about home vs office management   Not sexually active     Vitals:  Vitals:    01/30/25 0925   BP: 118/64   Temp: 98.1 °F (36.7 °C)       GENERAL EXAM:  GENERAL:  NAD  HEAD: NC/AT  EYES: symmetric bilaterally. No icterus. Sclera w/o injection  NECK: no masses  LUNGS:  Normal resp effort  ABDOMEN:  Soft, no mass  MUSK: normal gait, ROM wnl. No edema  PSYCH: A&Ox3. Recent and remote memory intact    PELVIC EXAM: RN, present for exam as a chaperone  Ext. Gen: +atrophy, no lesions  Urethra: +atrophy, nontender,   Bladder:+fullness, nontender  Vagina: +atrophy, no lesions  Cervix: no bleeding, no lesions, nontender  Uterus: +mobile  Perineum: wnl  Rectum: deferred     Pelvic support:  [place]: 2  Anterior: 3  Posterior: 1      A #4 ring with support pessary was placed without difficulty.  Patient stated it was comfortable and supportive  Able to void freely  Patient able to demonstrate self removal but unable to insert pessary  Will continue with office care at this time     Impression/Plan:    ICD-10-CM    1. Uterovaginal prolapse, incomplete  N81.2       2. Pelvic muscle wasting  N81.84       3. Postmenopausal atrophic vaginitis  N95.2           Discussion Items:   Discussed mgmt of vulvovaginal atrophy with vaginal estrogen cream. Reviewed associated benefits, risks, alternatives, and goals. Recommend low dose twice weekly mgmt   Discussed management of pelvic organ prolapse including but not limited to behavioral modifications, conservative options, and surgical management.   Discussed pessary management including benefits and risks. Discussed importance of keeping regularly scheduled pessary checks in  prevention of complications related to pessary use.     Continue pessary management office care  Will consider self teaching at next visit  Recommend vaginal estrogen cream twice weekly - will check with oncologist, recommend vaginal moisturizers twice a week   Bladder diet/drill  Bowel regimen reviewed  Call with s/sx of UTI, problems with pessary   All questions answered  She understands and agrees to plan      Return in about 2 weeks (around 2/13/2025) for pessary care, sooner prn .      Geeta Holcomb PA-C    The 21st Century Cures Act makes medical notes like these available to patients in the interest of transparency. However, be advised this is a medical document. It is intended as peer to peer communication. It is written in medical language and may contain abbreviations or verbiage that are unfamiliar. It may appear blunt or direct. Medical documents are intended to carry relevant information, facts as evident, and the clinical opinion of the practitioner.

## 2025-01-30 NOTE — PATIENT INSTRUCTIONS
Recommend starting low dose vaginal estrogen cream to be placed inside the vagina twice a week to help with pessary maintenance and comfort - please check with Dr Mccarty office to see if he would be okay with starting vaginal estrogen cream     Recommend vaginal moisturizers to be used inside the vagina 2-3 times a week at night (some examples are Refresh or Replens, these can be purchased over the counter at the pharmacy or on amazon)

## 2025-02-13 ENCOUNTER — OFFICE VISIT (OUTPATIENT)
Dept: UROLOGY | Facility: CLINIC | Age: 80
End: 2025-02-13
Attending: OBSTETRICS & GYNECOLOGY
Payer: MEDICARE

## 2025-02-13 VITALS — TEMPERATURE: 97 F | RESPIRATION RATE: 17 BRPM | BODY MASS INDEX: 23 KG/M2 | HEIGHT: 61 IN

## 2025-02-13 DIAGNOSIS — N81.2 UTEROVAGINAL PROLAPSE, INCOMPLETE: Primary | ICD-10-CM

## 2025-02-13 DIAGNOSIS — N95.2 POSTMENOPAUSAL ATROPHIC VAGINITIS: ICD-10-CM

## 2025-02-13 DIAGNOSIS — N81.84 PELVIC MUSCLE WASTING: ICD-10-CM

## 2025-02-13 PROCEDURE — 99212 OFFICE O/P EST SF 10 MIN: CPT

## 2025-02-13 RX ORDER — ESTRADIOL 0.1 MG/G
CREAM VAGINAL
Qty: 42.5 G | Refills: 3 | Status: SHIPPED | OUTPATIENT
Start: 2025-02-13

## 2025-02-13 NOTE — PROGRESS NOTES
Pt presents to follow up bulge  Doing well with #4 ring with support pessary, office care  Reports pessary is comfortable   Denies discharge  Denies bleeding  Denies s/sx of UTI  Bowels regular   Pt is using vaginal estrogen cream twice weekly - okayed by oncologist     Happy with pessary, feels supported  She is not currently interested in surgical management of her pelvic organ prolapse.    Urogynecology Summary:  Urogynecology Summary  Prolapse: No  WYATT: No  Urge Incontinence: Yes (this week)  Nocturia Frequency: 2  Frequency: 2 - 3 hours  Incomplete emptying: No  Constipation: No  Wears pad day?: 1  Wears Pad Night?: 0  Activities are limited by UI/POP?: No  Currently Sexually Active: No      Vitals:  Vitals:    02/13/25 1419   Resp: 17   Temp: 96.6 °F (35.9 °C)       GENERAL EXAM:  GENERAL:  NAD  HEAD: NC/AT  EYES: symmetric bilaterally. No icterus. Sclera w/o injection  NECK: no masses  LUNGS:  Normal resp effort  ABDOMEN:  Soft, no mass  MUSK: normal gait, ROM wnl. No edema  PSYCH: A&Ox3. Recent and remote memory intact    PELVIC EXAM: THAIS Mitchell, present for exam as a chaperone  Ext. Gen: +atrophy, no lesions  Urethra: +atrophy, nontender,   Bladder:+fullness, nontender  Vagina: +atrophy, no lesions  Cervix: no bleeding, no lesions, nontender  Uterus: +mobile  Perineum: wnl  Rectum: deferred      Pelvic support:  [place]: 2  Anterior: 3  Posterior: 1    Her pessary was removed, cleaned, and reinserted w/o difficulty    Impression/Plan:    ICD-10-CM    1. Uterovaginal prolapse, incomplete  N81.2       2. Pelvic muscle wasting  N81.84       3. Postmenopausal atrophic vaginitis  N95.2 estradiol (ESTRACE) 0.1 MG/GM Vaginal Cream            Discussion Items:   Discussed mgmt of vulvovaginal atrophy with vaginal estrogen cream. Reviewed associated benefits, risks, alternatives, and goals. Recommend low dose 2-3x weekly mgmt   Discussed management of pelvic organ prolapse including but not limited to behavioral  modifications, conservative options, and surgical management.   Discussed pessary management including benefits and risks. Discussed importance of keeping regularly scheduled pessary checks in prevention of complications related to pessary use.     Continue pessary management, office care  Continue vag estrogen 2-3x weekly  Daily pelvic exercises  Bowel management reviewed  Call with s/sx of UTI, problems with pessary   All questions answered  Pt understands and agrees to plan       Return in about 4 weeks (around 3/13/2025) for pessary care, sooner prn.      Geeta Holcomb PA-C      The 21st Century Cures Act makes medical notes like these available to patients in the interest of transparency. However, be advised this is a medical document. It is intended as peer to peer communication. It is written in medical language and may contain abbreviations or verbiage that are unfamiliar. It may appear blunt or direct. Medical documents are intended to carry relevant information, facts as evident, and the clinical opinion of the practitioner.

## 2025-03-05 ENCOUNTER — HOSPITAL ENCOUNTER (OUTPATIENT)
Dept: GENERAL RADIOLOGY | Age: 80
Discharge: HOME | End: 2025-03-05
Attending: ORTHOPAEDIC SURGERY
Payer: MEDICARE

## 2025-03-05 ENCOUNTER — OFFICE VISIT (OUTPATIENT)
Dept: ORTHOPEDICS CLINIC | Facility: CLINIC | Age: 80
End: 2025-03-05
Payer: MEDICARE

## 2025-03-05 VITALS — BODY MASS INDEX: 22.21 KG/M2 | WEIGHT: 119.19 LBS | HEIGHT: 61.25 IN

## 2025-03-05 DIAGNOSIS — M25.562 PAIN IN BOTH KNEES, UNSPECIFIED CHRONICITY: ICD-10-CM

## 2025-03-05 DIAGNOSIS — M25.561 PAIN IN BOTH KNEES, UNSPECIFIED CHRONICITY: ICD-10-CM

## 2025-03-05 DIAGNOSIS — M17.0 PRIMARY OSTEOARTHRITIS OF BOTH KNEES: Primary | ICD-10-CM

## 2025-03-05 PROCEDURE — 73564 X-RAY EXAM KNEE 4 OR MORE: CPT | Performed by: ORTHOPAEDIC SURGERY

## 2025-03-05 PROCEDURE — 3008F BODY MASS INDEX DOCD: CPT | Performed by: ORTHOPAEDIC SURGERY

## 2025-03-05 PROCEDURE — 99204 OFFICE O/P NEW MOD 45 MIN: CPT | Performed by: ORTHOPAEDIC SURGERY

## 2025-03-05 PROCEDURE — 20610 DRAIN/INJ JOINT/BURSA W/O US: CPT | Performed by: ORTHOPAEDIC SURGERY

## 2025-03-05 RX ORDER — TRIAMCINOLONE ACETONIDE 40 MG/ML
40 INJECTION, SUSPENSION INTRA-ARTICULAR; INTRAMUSCULAR ONCE
Status: COMPLETED | OUTPATIENT
Start: 2025-03-05 | End: 2025-03-05

## 2025-03-05 RX ORDER — ASPIRIN 325 MG
325 TABLET ORAL DAILY
COMMUNITY

## 2025-03-05 RX ORDER — FLUOCINOLONE ACETONIDE 0.11 MG/ML
OIL AURICULAR (OTIC)
COMMUNITY
Start: 2025-02-27

## 2025-03-05 RX ADMIN — TRIAMCINOLONE ACETONIDE 40 MG: 40 INJECTION, SUSPENSION INTRA-ARTICULAR; INTRAMUSCULAR at 16:16:00

## 2025-03-05 NOTE — PROCEDURES
Risks and benefits of knee injection discussed with the patient, with risks including but not limited to pain and swelling at the injection site and/or within the knee joint, infection, elevation in blood pressure and/or glucose levels, facial flushing.  After informed consent, the patient's right knee was marked, locally anesthetized with skin refrigerant, prepped with topical antiseptic, and injected with a mixture of 1mL 40mg/mL Kenalog, 2mL 1% lidocaine and 2mL 0.5% marcaine through the inferolateral portal.  A band-aid was applied.  The patient tolerated the procedure well.    Gaston Shahid MD, FAAOS  [facility]  Phone [phone]  Fax [phone]

## 2025-03-05 NOTE — H&P
[facility] New Patient Note    CC:   Chief Complaint   Patient presents with    Knee Pain     Bilateral knee pain        HPI: This 79 year old female presents today with complaints of bilateral knee pain, currently right worse than left.  She notes that she has had issues with the left knee for decades, but has a previous history of surgery for meniscus tear in the 80s, the right knee has been more bothersome recently.  She notes that she was recently doing a bicycle and notes that she had pain around the outside of the knee, had some radiation up the lateral thigh and traces the IT band.  Symptoms have been worse with weightbearing, most pronounced with stairs going down more than up.  She also notices cracking and occasional locking of the knees.  She notes she did do steroid injection in the past years ago and this provided 6 months of relief, also tried viscosupplementation and this did not help/actually seem to make it worse.  She does not take any regular medications for pain.    Past Medical History:    Glaucoma    High blood pressure    HIGH CHOLESTEROL    History of breast cancer    History of skin cancer    basal cell    MENOPAUSE    OSTEOPENIA     Past Surgical History:   Procedure Laterality Date    Adenoidectomy      Colonoscopy      Lumpectomy right  2021          Other  2018    basal cells removed from face    Other surgical history      torn meniscus surgery, left knee 1970s    Tonsillectomy       Current Outpatient Medications   Medication Sig Dispense Refill    Fluocinolone Acetonide 0.01 % Otic Oil USE 4 DROPS TO AFFECTED EAR(S) 2 TIMES A DAY FOR 7 DAYS AS NEEDED FOR ITCHING      aspirin 325 MG Oral Tab Take 1 tablet (325 mg total) by mouth daily.      estradiol (ESTRACE) 0.1 MG/GM Vaginal Cream Apply 1/2  gram vaginally 2 times per week. 42.5 g 3    Calcium Carbonate-Vitamin D (CALCIUM 600-VITAMIN D3 OR) Take 400 mg by mouth daily.      tamoxifen 20 MG Oral Tab Take 1 tablet (20  mg total) by mouth daily.      Magnesium 200 MG Oral Tab Take by mouth at bedtime.      atorvastatin 10 MG Oral Tab       latanoprost 0.005 % Ophthalmic Solution   2    Vitamin D3, Cholecalciferol, 50 MCG (2000 UT) Oral Tab Take 2 tablets (4,000 Units total) by mouth.      Losartan Potassium (COZAAR) 50 MG Oral Tab Take by mouth daily.      fluorouracil 5 % External Cream Apply thin layer to right forearm BID for 2 weeks, off 1 week. Repeat cycle one time (Patient not taking: Reported on 3/5/2025)      metroNIDAZOLE 0.75 % External Cream Apply thinly to face BID (Patient not taking: Reported on 3/5/2025)      alendronate 70 MG Oral Tab Take 1 tablet (70 mg total) by mouth once a week. (Patient not taking: Reported on 3/5/2025)      aspirin 81 MG Oral Tab EC Take 325 mg by mouth as needed.   (Patient not taking: Reported on 3/5/2025)       Allergies[1]  Family History   Problem Relation Age of Onset    Prostate Cancer Father         prostate, lung    Colon Cancer Mother     Other (osteoporosis) Mother     Other (stroke) Mother      Social History     Occupational History    Not on file   Tobacco Use    Smoking status: Never    Smokeless tobacco: Never    Tobacco comments:     Updated 3/5/25   Vaping Use    Vaping status: Never Used   Substance and Sexual Activity    Alcohol use: Yes     Alcohol/week: 1.0 standard drink of alcohol     Types: 1 Glasses of wine per week     Comment: 5x a week     Drug use: No    Sexual activity: Not Currently     Partners: Male        ROS:  Detailed system review obtained and negative except as mentioned above      Physical Exam:    Ht 5' 1.25\" (1.556 m)   Wt 119 lb 3.2 oz (54.1 kg)   BMI 22.34 kg/m²   Constitutional: Awake, alert, no distress.  Very pleasant  Psychological: Appropriate affect.  Respiratory: Unlabored breathing.  Bilateral lower extremity:  Inspection: skin intact, no lesions or effusion, slight valgus alignment to the knees  Palpation: Tender to palpation about the  lateral joint line of the right knee, nontender left knee  Range of motion: Knees come within a few degrees with to within 5 degrees of full extension, flexion 110  Knee stable to varus valgus stress  Neuromuscular: 5 out of 5 quad strength  Vascular: Warm well-perfused      Imaging: X-rays of both the right and left knees personally viewed, independently interpreted and radiology report read.  Demonstrates valgus osteoarthritis of both knees, more pronounced on the left, with lateral more than medial joint space loss/bone-on-bone on the left knee, erosion into the tibial plateau on the left.  There is subchondral irregularity, large osteophytes more pronounced on the left knee.  Intra-articular appearing loose body/ossific density in the suprapatellar pouch on the left knee.      Assessment/Plan:  Diagnoses and all orders for this visit:    Primary osteoarthritis of both knees      Assessment: 79-year-old female with symptomatic radiographically severe left and moderate right knee osteoarthritis    Plan: I discussed the etiology, natural history, and management options for symptomatic knee osteoarthritis.  I discussed nonsurgical and surgical treatments, with nonsurgical treatments to include anti-inflammatory medications, injections, activity modification, weight loss, low impact exercise and possible therapy.  Surgery would be with knee replacement and is an elective operation reserved for when nonsurgical treatments no longer alleviate symptoms sufficiently.  She expressed understanding and would like to attempt nonsurgical treatment.  She is interested in steroid injection as this did provide relief in the past, and this was performed today for the right knee per her request as this is the more symptomatic.  She may want to proceed with the left knee down the road.  Additionally I did provide some home exercise direction for her with the [employer] IT band and the OS knee conditioning program, if she would like to  proceed with physical therapy she need only contact us.    Gaston Shahid MD, FAAOS  [facility]opa[facility]  Phone [phone]  Fax [phone]       [1]   Allergies  Allergen Reactions    Pcn [Bicillin L-A] RASH    Amoxicillin RASH    Combigan OTHER (SEE COMMENTS) and RASH     Eye redness

## 2025-03-13 ENCOUNTER — OFFICE VISIT (OUTPATIENT)
Dept: UROLOGY | Facility: CLINIC | Age: 80
End: 2025-03-13
Attending: OBSTETRICS & GYNECOLOGY
Payer: MEDICARE

## 2025-03-13 VITALS — HEIGHT: 61.25 IN | RESPIRATION RATE: 16 BRPM | BODY MASS INDEX: 22 KG/M2 | TEMPERATURE: 97 F

## 2025-03-13 DIAGNOSIS — N81.84 PELVIC MUSCLE WASTING: ICD-10-CM

## 2025-03-13 DIAGNOSIS — N95.2 POSTMENOPAUSAL ATROPHIC VAGINITIS: ICD-10-CM

## 2025-03-13 DIAGNOSIS — N81.2 UTEROVAGINAL PROLAPSE, INCOMPLETE: Primary | ICD-10-CM

## 2025-03-13 PROCEDURE — 57160 INSERT PESSARY/OTHER DEVICE: CPT

## 2025-03-13 PROCEDURE — 99212 OFFICE O/P EST SF 10 MIN: CPT

## 2025-03-13 NOTE — PROGRESS NOTES
Pt presents to follow up bulge  Previously fitted with #4 ring with support pessary, office care  Reports pessary is comfortable but expelled a week ago with BM, does not currently have pessary in  Denies discharge  Denies bleeding  Denies s/sx of UTI  Bowels irregular, not taking anything   Pt is using vaginal estrogen cream twice weekly - okayed by oncologist     Happy with pessary, feels supported  She is not currently interested in surgical management of her pelvic organ prolapse.    Urogynecology Summary:  Urogynecology Summary  Prolapse: Yes  WYATT: No  Urge Incontinence: Yes  Nocturia Frequency: 2  Frequency: 2 - 3 hours  Incomplete emptying: No  Constipation: No  Wears pad day?: 2 (1-2 light)  Wears Pad Night?: 0  Activities are limited by UI/POP?: No  Currently Sexually Active: No  Avoids sexual activity due to: Prolapse      Vitals:  Vitals:    03/13/25 1302   Resp: 16   Temp: 97.3 °F (36.3 °C)       GENERAL EXAM:  GENERAL:  NAD  HEAD: NC/AT  EYES: symmetric bilaterally. No icterus. Sclera w/o injection  NECK: no masses  LUNGS:  Normal resp effort  ABDOMEN:  Soft, no mass  MUSK: normal gait, ROM wnl. No edema  PSYCH: A&Ox3. Recent and remote memory intact    PELVIC EXAM: THAIS Hobson, present for exam as a chaperone  Ext. Gen: +atrophy, no lesions  Urethra: +atrophy, nontender,   Bladder:+fullness, nontender  Vagina: +atrophy, no lesions  Cervix: no bleeding, no lesions, nontender  Uterus: +mobile  Perineum: wnl  Rectum: deferred      Pelvic support:  [place]: 2  Anterior: 3  Posterior: 1    #5 ring with support pessary (roque care brand with oblique holes) placed, comfortable  Patient taught self insertion and removal and able to demonstrate   Patient able to void freely     Impression/Plan:    ICD-10-CM    1. Uterovaginal prolapse, incomplete  N81.2       2. Pelvic muscle wasting  N81.84       3. Postmenopausal atrophic vaginitis  N95.2             Discussion Items:   Discussed mgmt of vulvovaginal atrophy with  vaginal estrogen cream. Reviewed associated benefits, risks, alternatives, and goals. Recommend low dose 2-3x weekly mgmt   Discussed management of pelvic organ prolapse including but not limited to behavioral modifications, conservative options, and surgical management.   Discussed pessary management including benefits and risks. Discussed importance of keeping regularly scheduled pessary checks in prevention of complications related to pessary use.     Continue pessary management, home care  Continue vag estrogen 2-3x weekly  Daily pelvic exercises  Bowel management reviewed  Call with s/sx of UTI, problems with pessary   All questions answered  Pt understands and agrees to plan       Return in about 4 weeks (around 4/24/2025) for pessary care, sooner prn .      Geeta Holcomb PA-C      The 21st Century Cures Act makes medical notes like these available to patients in the interest of transparency. However, be advised this is a medical document. It is intended as peer to peer communication. It is written in medical language and may contain abbreviations or verbiage that are unfamiliar. It may appear blunt or direct. Medical documents are intended to carry relevant information, facts as evident, and the clinical opinion of the practitioner.

## 2025-04-10 ENCOUNTER — OFFICE VISIT (OUTPATIENT)
Dept: UROLOGY | Facility: CLINIC | Age: 80
End: 2025-04-10
Attending: OBSTETRICS & GYNECOLOGY
Payer: MEDICARE

## 2025-04-10 VITALS — RESPIRATION RATE: 16 BRPM | BODY MASS INDEX: 22 KG/M2 | HEIGHT: 61.25 IN | TEMPERATURE: 98 F

## 2025-04-10 DIAGNOSIS — N95.2 POSTMENOPAUSAL ATROPHIC VAGINITIS: ICD-10-CM

## 2025-04-10 DIAGNOSIS — N81.84 PELVIC MUSCLE WASTING: ICD-10-CM

## 2025-04-10 DIAGNOSIS — N81.2 UTEROVAGINAL PROLAPSE, INCOMPLETE: Primary | ICD-10-CM

## 2025-04-10 PROCEDURE — 99212 OFFICE O/P EST SF 10 MIN: CPT

## 2025-04-10 NOTE — PROGRESS NOTES
Pt presents to follow up bulge  Fitted with #5 ring with support pessary, home care - was able to remove but unable to insert  Reports pessary is comfortable when using   Denies discharge  Denies bleeding  Denies s/sx of UTI  Bowels irregular, not taking anything   Pt is not using vaginal estrogen cream twice weekly, since pessary was not in  Estrogen cream - okayed by oncologist     Happy with pessary, feels supported  She is not currently interested in surgical management of her pelvic organ prolapse.    Urogynecology Summary:  Urogynecology Summary  Prolapse: Yes  WYATT: No  Urge Incontinence: Yes (sometimes)  Nocturia Frequency: 2  Frequency: 2 - 3 hours  Incomplete emptying: No  Constipation: No  Wears pad day?: 2 (1-2 light)  Wears Pad Night?: 0  Activities are limited by UI/POP?: No  Currently Sexually Active: No      Vitals:  Vitals:    04/10/25 1208   Resp: 16   Temp: 98 °F (36.7 °C)       GENERAL EXAM:  GENERAL:  NAD  HEAD: NC/AT  EYES: symmetric bilaterally. No icterus. Sclera w/o injection  NECK: no masses  LUNGS:  Normal resp effort  ABDOMEN:  Soft, no mass  MUSK: normal gait, ROM wnl. No edema  PSYCH: A&Ox3. Recent and remote memory intact    PELVIC EXAM: THAIS Hobson, present for exam as a chaperone  Ext. Gen: +atrophy, no lesions  Urethra: +atrophy, nontender,   Bladder:+fullness, nontender  Vagina: +atrophy, no lesions  Cervix: no bleeding, no lesions, nontender  Uterus: +mobile  Perineum: wnl  Rectum: deferred      Pelvic support:  [place]: 2  Anterior: 3  Posterior: 1    Her pessary reinserted w/o difficulty    Impression/Plan:    ICD-10-CM    1. Uterovaginal prolapse, incomplete  N81.2       2. Pelvic muscle wasting  N81.84       3. Postmenopausal atrophic vaginitis  N95.2             Discussion Items:   Discussed mgmt of vulvovaginal atrophy with vaginal estrogen cream. Reviewed associated benefits, risks, alternatives, and goals. Recommend low dose 2-3x weekly mgmt   Discussed management of pelvic  organ prolapse including but not limited to behavioral modifications, conservative options, and surgical management.   Discussed pessary management including benefits and risks. Discussed importance of keeping regularly scheduled pessary checks in prevention of complications related to pessary use.     Continue pessary management, office care  Continue vag estrogen 2-3x weekly  Daily pelvic exercises  Bowel management reviewed  Call with s/sx of UTI, problems with pessary   All questions answered  Pt understands and agrees to plan      Return in about 6 weeks (around 5/22/2025) for pessary care, sooner prn .      Geeta Holcomb PA-C      The 21st Century Cures Act makes medical notes like these available to patients in the interest of transparency. However, be advised this is a medical document. It is intended as peer to peer communication. It is written in medical language and may contain abbreviations or verbiage that are unfamiliar. It may appear blunt or direct. Medical documents are intended to carry relevant information, facts as evident, and the clinical opinion of the practitioner.

## 2025-05-22 ENCOUNTER — OFFICE VISIT (OUTPATIENT)
Dept: UROLOGY | Facility: CLINIC | Age: 80
End: 2025-05-22
Attending: OBSTETRICS & GYNECOLOGY
Payer: MEDICARE

## 2025-05-22 VITALS — BODY MASS INDEX: 22 KG/M2 | HEIGHT: 61.25 IN | TEMPERATURE: 98 F | RESPIRATION RATE: 16 BRPM

## 2025-05-22 DIAGNOSIS — N95.2 POSTMENOPAUSAL ATROPHIC VAGINITIS: ICD-10-CM

## 2025-05-22 DIAGNOSIS — N81.84 PELVIC MUSCLE WASTING: ICD-10-CM

## 2025-05-22 DIAGNOSIS — N81.2 UTEROVAGINAL PROLAPSE, INCOMPLETE: Primary | ICD-10-CM

## 2025-05-22 DIAGNOSIS — N39.41 URGE INCONTINENCE: ICD-10-CM

## 2025-05-22 PROCEDURE — 99212 OFFICE O/P EST SF 10 MIN: CPT

## 2025-05-22 NOTE — PROGRESS NOTES
Pt presents to follow up bulge  Doing well with #5 ring with support pessary, office care  Reports pessary is comfortable   Denies discharge  Denies bleeding  Denies s/sx of UTI  Bowels regular with miralax   Pt is using vaginal estrogen cream twice weekly    Happy with pessary, feels supported  She is not currently interested in surgical management of her pelvic organ prolapse.    Urogynecology Summary:  Urogynecology Summary  Prolapse: No  WYATT: No  Urge Incontinence: Yes  Nocturia Frequency: 2  Frequency: 2 - 3 hours  Incomplete emptying: No  Constipation: No  Wears pad day?: 2 (1-2 light)  Wears Pad Night?: 0  Activities are limited by UI/POP?: No  Currently Sexually Active: No  Avoids sexual activity due to: Prolapse      Vitals:  Vitals:    05/22/25 0918   Resp: 16   Temp: 98.2 °F (36.8 °C)       GENERAL EXAM:  GENERAL:  NAD  HEAD: NC/AT  EYES: symmetric bilaterally. No icterus. Sclera w/o injection  NECK: no masses  LUNGS:  Normal resp effort  ABDOMEN:  Soft, no mass  MUSK: normal gait, ROM wnl. No edema  PSYCH: A&Ox3. Recent and remote memory intact    PELVIC EXAM: THAIS Hobson, present for exam as a chaperone  Ext. Gen: +atrophy, no lesions  Urethra: +atrophy, nontender,   Bladder:+fullness, nontender  Vagina: +atrophy, no lesions  Cervix: no bleeding, no lesions, nontender  Uterus: +mobile  Perineum: wnl  Rectum: deferred      Pelvic support:  [place]: 2  Anterior: 3  Posterior: 1    Her pessary was removed, cleaned, and reinserted w/o difficulty    Impression/Plan:    ICD-10-CM    1. Uterovaginal prolapse, incomplete  N81.2       2. Pelvic muscle wasting  N81.84       3. Postmenopausal atrophic vaginitis  N95.2       4. Urge incontinence  N39.41             Discussion Items:   Discussed mgmt of vulvovaginal atrophy with vaginal estrogen cream. Reviewed associated benefits, risks, alternatives, and goals. Recommend low dose 2-3x weekly mgmt   Discussed management of pelvic organ prolapse including but not limited  to behavioral modifications, conservative options, and surgical management.   Discussed pessary management including benefits and risks. Discussed importance of keeping regularly scheduled pessary checks in prevention of complications related to pessary use.     Continue pessary management, office care  Continue vag estrogen 2-3x weekly  Daily pelvic exercises  Bowel management reviewed  Call with s/sx of UTI, problems with pessary   All questions answered  Pt understands and agrees to plan       Return in about 10 weeks (around 7/31/2025) for pessary care, sooner prn .      Geeta Holcomb PA-C      The 21st Century Cures Act makes medical notes like these available to patients in the interest of transparency. However, be advised this is a medical document. It is intended as peer to peer communication. It is written in medical language and may contain abbreviations or verbiage that are unfamiliar. It may appear blunt or direct. Medical documents are intended to carry relevant information, facts as evident, and the clinical opinion of the practitioner.

## 2025-07-31 ENCOUNTER — OFFICE VISIT (OUTPATIENT)
Dept: UROLOGY | Facility: CLINIC | Age: 80
End: 2025-07-31
Attending: OBSTETRICS & GYNECOLOGY
Payer: MEDICARE

## 2025-07-31 VITALS — TEMPERATURE: 98 F | BODY MASS INDEX: 22 KG/M2 | HEIGHT: 61.25 IN | RESPIRATION RATE: 16 BRPM

## 2025-07-31 DIAGNOSIS — N81.2 UTEROVAGINAL PROLAPSE, INCOMPLETE: Primary | ICD-10-CM

## 2025-07-31 DIAGNOSIS — N39.41 URGE INCONTINENCE: ICD-10-CM

## 2025-07-31 DIAGNOSIS — N95.2 POSTMENOPAUSAL ATROPHIC VAGINITIS: ICD-10-CM

## 2025-07-31 DIAGNOSIS — N81.84 PELVIC MUSCLE WASTING: ICD-10-CM

## 2025-07-31 PROCEDURE — 99212 OFFICE O/P EST SF 10 MIN: CPT
